# Patient Record
Sex: MALE | Race: BLACK OR AFRICAN AMERICAN
[De-identification: names, ages, dates, MRNs, and addresses within clinical notes are randomized per-mention and may not be internally consistent; named-entity substitution may affect disease eponyms.]

---

## 2018-06-10 ENCOUNTER — HOSPITAL ENCOUNTER (EMERGENCY)
Dept: HOSPITAL 88 - ER | Age: 68
Discharge: HOME | End: 2018-06-10
Payer: COMMERCIAL

## 2018-06-10 VITALS — HEIGHT: 72 IN | WEIGHT: 208 LBS | BODY MASS INDEX: 28.17 KG/M2

## 2018-06-10 VITALS — SYSTOLIC BLOOD PRESSURE: 110 MMHG | DIASTOLIC BLOOD PRESSURE: 85 MMHG

## 2018-06-10 DIAGNOSIS — I50.9: ICD-10-CM

## 2018-06-10 DIAGNOSIS — J18.0: ICD-10-CM

## 2018-06-10 DIAGNOSIS — R05: ICD-10-CM

## 2018-06-10 DIAGNOSIS — E11.9: ICD-10-CM

## 2018-06-10 DIAGNOSIS — J44.1: ICD-10-CM

## 2018-06-10 DIAGNOSIS — R06.00: Primary | ICD-10-CM

## 2018-06-10 DIAGNOSIS — I10: ICD-10-CM

## 2018-06-10 LAB
ALBUMIN SERPL-MCNC: 3.8 G/DL (ref 3.5–5)
ALBUMIN/GLOB SERPL: 1.1 {RATIO} (ref 0.8–2)
ALP SERPL-CCNC: 71 IU/L (ref 40–150)
ALT SERPL-CCNC: 28 IU/L (ref 0–55)
ANION GAP SERPL CALC-SCNC: 13 MMOL/L (ref 8–16)
BASOPHILS # BLD AUTO: 0 10*3/UL (ref 0–0.1)
BASOPHILS NFR BLD AUTO: 0.3 % (ref 0–1)
BUN SERPL-MCNC: 18 MG/DL (ref 7–26)
BUN/CREAT SERPL: 16 (ref 6–25)
CALCIUM SERPL-MCNC: 9.8 MG/DL (ref 8.4–10.2)
CHLORIDE SERPL-SCNC: 100 MMOL/L (ref 98–107)
CK MB SERPL-MCNC: 3.6 NG/ML (ref 0–5)
CK SERPL-CCNC: 331 IU/L (ref 30–200)
CO2 SERPL-SCNC: 34 MMOL/L (ref 22–29)
DEPRECATED APTT PLAS QN: 38.1 SECONDS (ref 23.8–35.5)
DEPRECATED INR PLAS: 1.2
DEPRECATED NEUTROPHILS # BLD AUTO: 4.9 10*3/UL (ref 2.1–6.9)
EGFRCR SERPLBLD CKD-EPI 2021: > 60 ML/MIN (ref 60–?)
EOSINOPHIL # BLD AUTO: 0.2 10*3/UL (ref 0–0.4)
EOSINOPHIL NFR BLD AUTO: 2.8 % (ref 0–6)
ERYTHROCYTE [DISTWIDTH] IN CORD BLOOD: 14.4 % (ref 11.7–14.4)
GLOBULIN PLAS-MCNC: 3.5 G/DL (ref 2.3–3.5)
GLUCOSE SERPLBLD-MCNC: 140 MG/DL (ref 74–118)
HCT VFR BLD AUTO: 39.3 % (ref 38.2–49.6)
HGB BLD-MCNC: 12.3 G/DL (ref 14–18)
LYMPHOCYTES # BLD: 1.3 10*3/UL (ref 1–3.2)
LYMPHOCYTES NFR BLD AUTO: 16.7 % (ref 18–39.1)
MCH RBC QN AUTO: 29.8 PG (ref 28–32)
MCHC RBC AUTO-ENTMCNC: 31.3 G/DL (ref 31–35)
MCV RBC AUTO: 95.2 FL (ref 81–99)
MONOCYTES # BLD AUTO: 1.1 10*3/UL (ref 0.2–0.8)
MONOCYTES NFR BLD AUTO: 14.4 % (ref 4.4–11.3)
NEUTS SEG NFR BLD AUTO: 65.7 % (ref 38.7–80)
PLATELET # BLD AUTO: 206 X10E3/UL (ref 140–360)
POTASSIUM SERPL-SCNC: 4 MMOL/L (ref 3.5–5.1)
PROTHROMBIN TIME: 14.3 SECONDS (ref 11.9–14.5)
RBC # BLD AUTO: 4.13 X10E6/UL (ref 4.3–5.7)
SODIUM SERPL-SCNC: 143 MMOL/L (ref 136–145)

## 2018-06-10 PROCEDURE — 82550 ASSAY OF CK (CPK): CPT

## 2018-06-10 PROCEDURE — 83880 ASSAY OF NATRIURETIC PEPTIDE: CPT

## 2018-06-10 PROCEDURE — 80053 COMPREHEN METABOLIC PANEL: CPT

## 2018-06-10 PROCEDURE — 82553 CREATINE MB FRACTION: CPT

## 2018-06-10 PROCEDURE — 93005 ELECTROCARDIOGRAM TRACING: CPT

## 2018-06-10 PROCEDURE — 71046 X-RAY EXAM CHEST 2 VIEWS: CPT

## 2018-06-10 PROCEDURE — 84484 ASSAY OF TROPONIN QUANT: CPT

## 2018-06-10 PROCEDURE — 85025 COMPLETE CBC W/AUTO DIFF WBC: CPT

## 2018-06-10 PROCEDURE — 36415 COLL VENOUS BLD VENIPUNCTURE: CPT

## 2018-06-10 PROCEDURE — 85610 PROTHROMBIN TIME: CPT

## 2018-06-10 PROCEDURE — 99284 EMERGENCY DEPT VISIT MOD MDM: CPT

## 2018-06-10 PROCEDURE — 85730 THROMBOPLASTIN TIME PARTIAL: CPT

## 2018-06-10 NOTE — XMS REPORT
Clinical Summary

 Created on: 06/10/2018



Tom Roacarolynn ANTON

External Reference #: [id number]

: 1950

Sex: Male



Demographics







 Address  [address] [place]  [address]

 

 Home Phone  [phone]

 

 Preferred Language  English

 

 Marital Status  

 

 Anabaptist Affiliation  Unknown

 

 Race  Black or 

 

 Ethnic Group  Non-





Author







 Author  [facility]

 

 Organization  [facility]

 

 Address  Unknown

 

 Phone  Unavailable







Support







 Name  Relationship  Address  Phone

 

 Ana Roa  [employer]  [address] [place]  [address]  [phone]







Care Team Providers







 Care Team Member Name  Role  Phone

 

 Asked, Pcp  PCP  Unavailable







Allergies

No Known Allergies



Current Medications







      



  Prescription   Sig.   Disp.   Refills   Start   End Date   Status



      Date  

 

      



  acetaminophen (TYLENOL)   Take 500 mg by mouth 2       Active



  500 MG tablet   (two) times a day. For     



   arthritis     

 

      



  albuterol (PROAIR   Inhale 2 puffs 2 (two)       Active



  HFA,PROVENTIL   times a day as needed for     



  HFA,VENTOLIN HFA) 90   wheezing.     



  mcg/actuation inhaler      

 

      



  aspirin (ECOTRIN) 81 MG   Take 81 mg by mouth       Active



  enteric coated tablet   daily.     

 

      



  atorvastatin (LIPITOR) 10   Take 10 mg by mouth       Active



  MG tablet   daily.     

 

      



  glipiZIDE (GLUCOTROL) 5   Take 5 mg by mouth daily.       Active



  MG 24 hr tablet      

 

      



  clotrimazole 1 % ointment   Apply 1 application       Active



   topically 2 (two) times a     



   week. On Sundays and     



    for rash on     



   arm     

 

      



  lisinopril   Take 20 mg by mouth       Active



  (PRINIVIL,ZESTRIL) 20 mg   nightly.     



  tablet      

 

      



  metFORMIN (GLUCOPHAGE)   Take 850 mg by mouth 2       Active



  850 mg tablet   (two) times a day with     



   meals.     

 

      



  sotalol (BETAPACE) 80 MG   Take 80 mg by mouth 2       Active



  tablet   (two) times a day.     

 

      



  ergocalciferol (VITAMIN   Take 50,000 Units by       Active



  D2) 50,000 unit capsule   mouth once a week. On     



        







Active Problems







 



  Problem   Noted Date

 

 



  COPD exacerbation   2017

 

 



  Acute CHF (congestive heart failure)   2017

 

 



  Essential hypertension   2017

 

 



  Type 2 diabetes mellitus   2017







Social History







    



  Tobacco Use   Types   Packs/Day   Years Used   Date

 

    



  Never Smoker    









   



  Alcohol Use   Drinks/Week   oz/Week   Comments

 

   



  No   









 



  Sex Assigned at Birth   Date Recorded

 

 



  Not on file 







Last Filed Vital Signs

Not on file



Plan of Treatment







   



  Health Maintenance   Due Date   Last Done   Comments

 

   



  DIABETIC FOOT EXAM   1958  

 

   



  DIABETIC RETINAL EYE EXAM   1958  

 

   



  URINE MICROALBUMIN   1958  

 

   



  COLON CANCER SCREENING   1998  

 

   



  SHINGRIX VACCINE (#1)   1998  

 

   



  ZOSTER VACCINE   2008  

 

   



  PNEUMOCOCCAL   2013  



  POLYSACCHARIDE VACCINE   



  AGE 65 AND OVER   

 

   



  PNEUMOCOCCAL-13   2013  

 

   



  INFLUENZA VACCINE   2018  







Results

Not on fileafter 2017



Insurance







     



  Payer   Benefit   Subscriber ID   Type   Phone   Address



   Plan /    



   Group    

 

     



  [employer]   [employer]   xxxxxxxxx   O  



   [facility]    









     



  Guarantor Name   Account   Relation to   Date of   Phone   Billing Address



   Type   Patient   Birth  

 

     



  AJITH ROA   Personal/F   Self   1950   Home:   [address]     [phone]   [place] [address]

## 2018-06-10 NOTE — DIAGNOSTIC IMAGING REPORT
EXAMINATION:  CHEST 2 VIEWS    



INDICATION:      

\S\SOB

\S\[id number]

\S\[id number]

\S.br\

COMPARISON:  3/6/2017

     

FINDINGS:  PA and lateral views



TUBES and LINES:  Stable single lead left lower lateral chest wall cardiac

device with tip overlying right atrium.



LUNGS and pleura:  Lungs are well inflated. Central peribronchovascular

thickening/cuffing. Left basilar subsegmental atelectasis and possible trace

left pleural effusion. No visible pneumothorax.



HEART AND MEDIASTINUM:  The cardiomediastinal silhouette is unremarkable.    



BONES AND SOFT TISSUES:  No acute osseous lesion.  Soft tissues are

unremarkable.



UPPER ABDOMEN: No free air under the diaphragm.    



IMPRESSION: 

Central peribronchovascular thickening/cuffing. 

Left basilar subsegmental atelectasis and possible trace left pleural effusion.

Underlying/developing infiltrate cannot be excluded.





Signed by: Dr. Kevin Ragland MD on 6/10/2018 9:12 AM

## 2018-06-10 NOTE — XMS REPORT
Ambulatory Summary

 Created on: 04/10/2018



Ajith Monet

External Reference #: [id number]

: 1950

Sex: Male



Demographics







 Address  [address] [place]  [address]

 

 Home Phone  [phone]

 

 Preferred Language  English

 

 Marital Status  

 

 Rastafarian Affiliation  Unknown

 

 Race  Black or 

 

 Ethnic Group  Unknown





Author







 Organization  Unknown

 

 Address  [address] [place] [address]  [address]



 

 Phone  [phone]







Care Team Providers







 Care Team Member Name  Role  Phone

 

 Pranav Payne Jr  Unavailable  Unavailable



                                                      



Allergies

          





 Code  Code System  Name  Reaction  Severity  Status  Onset









 NKDA        



                                                                              



Medications

          





 Name  Status  Start Date  Stop Date  









 acetaminophen 500 mg tablet

 Take 1 tablet every day by oral route in the evening.  Completed    2017

 

 albuterol sulfate 2.5 mg/3 mL (0.083 %) solution for nebulization  Active    
Not available

 

 amoxicillin 875 mg-potassium clavulanate 125 mg tablet  Completed    2018

 

 Anoro Ellipta 62.5 mcg-25 mcg/actuation powder for inhalation

 Inhale 1 puff every day by inhalation route for 30 days.  Active    Not 
available

 

 Aspir-81 81 mg tablet,delayed release

 Take 1 tablet every day by oral route.  Completed    2017

 

 atorvastatin 80 mg tablet  Active    Not available

 

 azithromycin 250 mg tablet  Completed    2018

 

 Blood Glucose Test strips

 TEST BLOOD SUGAR 1X/DAY  Active    Not available

 

 bumetanide 2 mg tablet

 Take 1 tablet twice a day by oral route.  Active    Not available

 

 fluconazole 100 mg tablet  Completed    2017

 

 furosemide 80 mg tablet  Completed    2017

 

 glimepiride 2 mg tablet  Completed    2017

 

 glimepiride 4 mg tablet  Completed    2017

 

 glipizide 5 mg tablet

 TAKE 1/2 TABLET BY MOUTH DAILY  Active    Not available

 

 ibuprofen 400 mg tablet  Completed    2017

 

 ipratropium-albuterol 0.5 mg-3 mg(2.5 mg base)/3 mL nebulization soln

 USE ONE VIAL VIA NEBULIZER 4 TIMES DAILY AS NEEDED  Active    Not available

 

 levofloxacin 250 mg tablet  Completed    2017

 

 levofloxacin 500 mg tablet  Completed    2018

 

 levofloxacin 750 mg tablet  Completed    2017

 

 lisinopril 20 mg tablet  Active    Not available

 

 lisinopril 20 mg-hydrochlorothiazide 12.5 mg tablet  Completed    2017

 

 Longs Adult Low Strength ASA 81 mg tablet,delayed release

 Take 1 tablet every day by oral route.  Active    Not available

 

 metformin 850 mg tablet

 Take 1 tablet 3 times a day by oral route for 90 days.  Active    Not 
available

 

 methylprednisolone 4 mg tablets in a dose pack  Completed    2018

 

 montelukast 10 mg tablet  Active    Not available

 

 oseltamivir 75 mg capsule  Completed    2017

 

 potassium chloride ER 10 mEq capsule,extended release  Completed    2017

 

 potassium chloride ER 10 mEq tablet,extended release

 Take 3 tablets every day by oral route for 30 days.  Active    Not available

 

 potassium chloride ER 20 mEq tablet,extended release(part/cryst)  Completed  
  2017

 

 prednisone 20 mg tablet  Completed    2017

 

 prednisone 5 mg tablet  Completed    2017

 

 Proventil HFA 90 mcg/actuation aerosol inhaler

 Inhale 2 puffs twice a day by inhalation route.  Active    Not available

 

 ReliaMed Lancet 30 gauge  Completed    2017

 

 sotalol 80 mg tablet

 1 TABLET TWICE DAILY  Active    Not available

 

 Symbicort 160 mcg-4.5 mcg/actuation HFA aerosol inhaler

 Inhale 2 puffs twice a day by inhalation route.  Active    Not available

 

 trazodone 50 mg tablet  Completed    2017

 

 True Metrix Level 1 solution  Completed    2017

 

 TRUEplus Lancets 28 gauge

 Take 1 each twice a day by miscell. route for 90 days.  Active    Not 
available

 

 Vitamin D2 50,000 unit capsule

 Take 1 capsule every week by oral route.  Active    Not available









 Notes: PT ALSO USES OXIGEN AT HOME



                                                                               
                                                                               
                                                                               
                                                                               
                                                                               
                                                               



Problems

                      





 Name                    Status                    Onset Date                   
 Source                                    









 Type 2 Diabetes Mellitus  Active  2017  

 

 Mixed Hyperlipidemia  Active  2017  

 

 Gout  Active  2017  

 

 Hypertensive Heart and Renal Disease with (Congestive) Heart Failure  Active  
2017  

 

 Coronary Arteriosclerosis  Active  2017  

 

 Chronic Systolic Heart Failure  Active  2017  

 

 Chronic Obstructive Lung Disease  Active  2017  

 

 Cardiac Defibrillator in Situ  Unknown  2017  

 

 History of Coronary Artery Bypass Grafting  Active  2017  

 

 Diabetic Complication  Active  2017  

 

 History of Placement of Stent for Coronary Artery Disease  Active  2017  


 

 Chronic Kidney Disease Stage 3  Active  2017  

 

 Raised Prostate Specific Antigen  Active  2017  

 

 Chronic Atrial Flutter  Active  2018  



                                                                               
                                                                               
                                                            



Procedures

          





 Date  Name  Performed by  









   Stomach Surgery Procedure

Notes: AGE 59  Information not available

 

   Appendectomy

Notes: AGE 33  Information not available

 

 2017  Electrocardiogram  [facility]

 [address]

[place] [address] [phone] (Work Place)

 

 2018  XR, Chest, 2 View  [facility]

 [address] [place] [address]

 [phone] ([facility] Place)









 Notes: FIBRILLATOR INSERTION: AGE 63 (14)



                                                                               
                             



Lab Results

                      





 Date                    Name                    Specimen                    
Result                    Interpretation                    Description        
            Value                    Range                    Status           
         Address                                    









 2017  HIV-1/2 Ag and Abs Screen, 4TH Gen. W/rflx (39[address])     Normal      
HIV Ag/Ab, 4TH Gen   non-reactive  non-reactive  Final  [facility] 
Laboratory: [address] [place] [address]

 

 2017  PSA, Serum or Plasma     High      PSA, Total   12.1 NG/mL  < or=
4.0 NG/mL  Final  [facility]: [address] [place] [address]

 

 2017  CMP, Serum or Plasma     High      Glucose   237 mg/dL  65-99 mg/
dL  Final  [facility]: [address]

 

        High      Urea Nitrogen (BUN)   32 mg/dL  7-25 mg/dL  Final  [facility]: [address]

 

        High      Creatinine   1.72 mg/dL  0.70-1.25 mg/dL  Final  [facility]: [address]

 

        Low      eGFR Non-afr. American   40 mL/min/1.73m2  > or=60 mL/min/
1.73m2  Final  [facility]: [address]

 

        Low      eGFR African American   46 mL/min/1.73m2  > or=60 mL/min/
1.73m2  Final  [facility]: [address]

 

        Normal      BUN/creatinine Ratio   19 (calc)  6-22 (calc)  Final  
[facility]: [address]

 

        Normal      Sodium   141 mmol/L  135-146 mmol/L  Final  [facility]: [address]

 

        Normal      Potassium   4.3 mmol/L  3.5-5.3 mmol/L  Final  [facility]: [address]

 

        Normal      Chloride   99 mmol/L   mmol/L  Final  [facility]: [address]

 

        Normal      Carbon Dioxide   26 mmol/L  20-31 mmol/L  Final  [facility]: [address]

 

        Normal      Calcium   9.4 mg/dL  8.6-10.3 mg/dL  Final  [facility]: [address]

 

        Normal      Protein, Total   7.2 g/dL  6.1-8.1 g/dL  Final  [facility]: [address]

 

        Normal      Albumin   3.9 g/dL  3.6-5.1 g/dL  Final  [facility]: [address]

 

        Normal      Globulin   3.3 g/dL (calc)  1.9-3.7 g/dL (calc)  Final  
[facility]: [address]

 

        Normal      Albumin/globulin Ratio   1.2 (calc)  1.0-2.5 (calc)  
Final  [facility]: [address]

 

        Normal      Bilirubin, Total   0.5 mg/dL  0.2-1.2 mg/dL  Final  
[facility]: [address]

 

        Normal      Alkaline Phosphatase   74 U/L   U/L  Final  [facility]: [address]

 

        High      Ast   39 U/L  10-35 U/L  Final  [facility] 
Laboratory: [address]

 

        Normal      Alt   35 U/L  9-46 U/L  Final  [facility] 
Laboratory: [address]

 

 2017  Hepatitis C Virus RNA, Quant, PCR, Serum or Plasma     Normal      
Hepatitis C Antibody   non-reactive  non-reactive  Final  [facility]: [address]

 

        Normal      Signal to Cut-off   0.05  <1.00  Final  [facility]: [address]

 

 2017  HbA1C (Hemoglobin a1C), Blood     High      Hemoglobin a1C   6.3 % 
of total HGB  <5.7 % of total HGB  Final  [facility]: 
[address]

 

              EAG (mg/dL)   134 (calc)    Final  [facility] 
Laboratory: [address]

 

              EAG (mmol/L)   7.4 (calc)    Final  [facility] 
Laboratory: [address]

 

 2017  CBC W/ Auto Diff     Normal      White Blood Cell Count   8.2 
thousand/uL  3.8-10.8 thousand/uL  Final  [facility]: 
[address]

 

        Normal      Red Blood Cell Count   4.53 million/uL  4.20-5.80 million/
uL  Final  [facility]: [address]

 

        Normal      Hemoglobin   13.2 g/dL  13.2-17.1 g/dL  Final  [facility]: [address]

 

        Normal      Hematocrit   40.9 %  38.5-50.0 %  Final  [facility]: [address]

 

        Normal      Mcv   90.3 fL  80.0-100.0 fL  Final  [facility]: [address] [place]

 

        Normal      Mch   29.1 pg  27.0-33.0 pg  Final  [facility]: [address] [place]

 

        Normal      Mchc   32.3 g/dL  32.0-36.0 g/dL  Final  [facility]: [address] [place]

 

        Normal      Rdw   12.7 %  11.0-15.0 %  Final  [facility] 
Laboratory: [address]

 

        Normal      Platelet Count   313 thousand/uL  140-400 thousand/uL  
Final  [facility]: [address] [place]

 

        Normal      Mpv   11.7 fL  7.5-12.5 fL  Final  [facility]: [address]

 

        Normal      Absolute Neutrophils   4608 cells/uL  3922-2803 cells/uL  
Final  [facility]: [address]

 

              Absolute Band Neutrophils       Preliminary  [facility]: [address]

 

              Absolute Metamyelocytes       Preliminary  [facility]: [address]

 

              Absolute Myelocytes       Preliminary  [facility]: [address] [place]

 

              Absolute Promyelocytes       Preliminary  [facility]: [address]

 

        Normal      Absolute Lymphocytes   2124 cells/uL  850-3900 cells/uL  
Final  [facility]: [address] [place]

 

        Normal      Absolute Monocytes   927 cells/uL  200-950 cells/uL  
Final  [facility]: [address] [place]

 

        Normal      Absolute Eosinophils   492 cells/uL   cells/uL  
Final  [facility]: [address] [place]

 

        Normal      Absolute Basophils   49 cells/uL  0-200 cells/uL  Final  
[facility]: [address], [place]

 

              Absolute Blasts       Preliminary  [facility] 
Laboratory: [address] [place]

 

              Absolute Nucleated RBC       Preliminary  [facility]: [address] [place]

 

        Normal      Neutrophils   56.2 %    Final  [facility] 
Laboratory: [address], [place]

 

              Band Neutrophils       Preliminary  [facility] 
Laboratory: [address] [place]

 

              Metamyelocytes       Preliminary  [facility] 
Laboratory: [address]

 

              Myelocytes       Preliminary  [facility] 
Laboratory: [address] [place]

 

              Promyelocytes       Preliminary  [facility] 
Laboratory: [address]

 

        Normal      Lymphocytes   25.9 %    Final  [facility] 
Laboratory: [address], [place]

 

              Reactive Lymphocytes       Preliminary  [facility]: [address] [place]

 

        Normal      Monocytes   11.3 %    Final  [facility] 
Laboratory: [address] [place]

 

        Normal      Eosinophils   6.0 %    Final  [facility] 
Laboratory: [address] [place]

 

        Normal      Basophils   0.6 %    Final  [facility] 
Laboratory: [address], [place]

 

              Blasts       Preliminary  [facility]
: [address], [place]

 

              Nucleated RBC       Preliminary  [facility] 
Laboratory: [address], [place]

 

              Comment(s)       Preliminary  [facility] 
Laboratory: [address], [place]

 

 2017  LDL, Serum           Ldl   66       

 

 2017  HbA1C (Hemoglobin a1C), Blood           A1C   7.5       

 

   Glucose, Fingerstick, Blood           Blood Glucose: mg/dl   101      [facility]: [address] [address], [place]

 

   Glucose, Fingerstick, Blood           Blood Glucose: mg/dl   270      [facility]: [address] [address], [place]

 

   Electrocardiogram           Rate & Rhythm   rrr      [facility]: [address] [address], [place]

 

              Qrs         [facility]: [address] [address]Pending sale to [facility]

 

              GA Interval         [facility]: [address] [address], [place]

 

              QRS Duration         [facility]: [address] [address], 
[place]

 

              QT Interval         [facility]: [address] [address], [place]



                                                                               
                                                                               
                              



Past Encounters

                      





 2018

Edema of Lower Extremity; Chronic Kidney Disease Stage 3

Zahira Marrufo MD: [address], [facility] [address] [address], 
Ph. [phone]

 

 2018

Community Acquired Pneumonia; Chronic Obstructive Lung Disease; Type 2 Diabetes 
Mellitus with Multiple Complications

Zahira Marrufo MD: [address], [facility] [address] [address], 
Ph. [phone]

 

 2018

Asthma; Chronic Obstructive Lung Disease; Chronic Atrial Flutter; Chronic 
Kidney Disease Due to Type 2 Diabetes Mellitus

Zahira Marrufo MD: [address], [address] [address], 
Ph. [phone]

 

 2018

Chronic Obstructive Lung Disease; Acute Exacerbation of Chronic Obstructive 
Airways Disease; Pneumococcal Vaccination; Hypertensive Heart and Renal Disease 
with (Congestive) Heart Failure; Peripheral Vascular Disease; Renal Disorder 
Due to Type 2 Diabetes Mellitus

Zahira Marrufo MD: [address], [facility] [address], [place] [address], 
Ph. [phone]

 

 2017

Raised Prostate Specific Antigen; Lower Urinary Tract Symptoms Due to Benign 
Prostatic Hypertrophy; Type 2 Diabetes Mellitus; Chronic Kidney Disease Stage 3

Pranav Payne Jr, MD: [address], [facility] [address], [place] [address], Ph. [phone]
[phone]

 

 2017

Hypertensive Heart and Renal Disease with (Congestive) Heart Failure; Automatic 
Implantable Cardiac Defibrillator in Situ; Coronary Arteriosclerosis; History 
of Placement of Stent for Coronary Artery Disease; Type 2 Diabetes Mellitus; 
Acute Nontraumatic Kidney Injury; Screening for Malignant Neoplasm of Colon; 
Screening for Disorder; Screening for Malignant Neoplasm of Prostate; 
Immunization Refused; HIV Screening

Pranav Payne Jr, MD: [address], [facility] [address], [place] [address], Ph. [phone]
[phone]

 

 2017

Chest Pain; Coronary Arteriosclerosis; History of Placement of Stent for 
Coronary Artery Disease; Chronic Systolic Heart Failure; Cardiac Defibrillator 
in Situ; Hypertensive Heart and Renal Disease with (Congestive) Heart Failure; 
Mixed Hyperlipidemia; Type 2 Diabetes Mellitus; Chronic Obstructive Lung Disease
; Pneumococcal Vaccination; Viral Immunization; Body Mass Index 25-29 - 
Overweight; Raised Prostate Specific Antigen

Pranav Payne Jr, MD: [address] [facility], [facility] [address], [place] [address], Ph. [phone]
[phone]



                                                                               
                                                                     



Social History

          





 Smoking Status  Never Smoker   



                                                                              



Vaccine List

          





 Vaccine Type

 

 influenza, unspecified formulation

 

 2016

 

 pneumococcal polysaccharide PPV23

 

 10/17/2014

 

 Tdap

 

 2014



                                                                               
                   



Plan of Care

                      





 Reminders                                                              
Provider                

 

 Appointments  None recorded.    

 

 Lab  None recorded.    

 

 Referral  None recorded.    

 

 Procedures  None recorded.    

 

 Surgeries  None recorded.    

 

 Imaging  None recorded.    



                                                                              



Vitals

          2018 03:45PM Est Patient







 Height    Weight    BMI    Blood Pressure 

 

  6 ft      209 lbs      28.3 kg/m2      120/70 mm[Hg]  



2018 03:00PM Work In Same Day







 Height    Weight    BMI    Blood Pressure 

 

  6 ft      207 lbs      28.1 kg/m2      120/82 mm[Hg]  



2018 10:00AM Est Patient







 Height    Weight    BMI    Blood Pressure 

 

  6 ft      207 lbs      28.1 kg/m2      118/70 mm[Hg]  



2018 02:45PM Work In Same Day







 Height    Weight    BMI    Blood Pressure 

 

  6 ft      207 lbs      28.1 kg/m2      110/70 mm[Hg]  



2017 10:00AM Work In Same Day







 Height    Weight    BMI    Blood Pressure 

 

  6 ft      208.2 lbs      28.2 kg/m2      118/72 mm[Hg]  



2017 04:00PM Est Patient







 Height    Weight    BMI    Blood Pressure 

 

  6 ft      205.2 lbs      27.8 kg/m2      116/68 mm[Hg]  



2017 10:30AM NP/EST CPX







 Height    Weight    BMI    Blood Pressure 

 

  6 ft      217 lbs      29.4 kg/m2      136/84 mm[Hg]

## 2018-06-10 NOTE — XMS REPORT
Patient Summary Document

 Created on: 06/10/2018



PAUL ROA

External Reference #: [id number]

: 1950

Sex: Male



Demographics







 Address  [address] [place] [address]  [address]

 

 Home Phone  [phone]

 

 Preferred Language  Unknown

 

 Marital Status  Unknown

 

 Scientologist Affiliation  Unknown

 

 Race  Unknown

 

 Ethnic Group  Unknown





Author







 Author  [facility]

 

 Address  Unknown

 

 Phone  Unavailable







Care Team Providers







 Care Team Member Name  Role  Phone

 

 ER, PHYSICIAN  PP  Unavailable







Problems

This patient has no known problems.



Allergies, Adverse Reactions, Alerts

This patient has no known allergies or adverse reactions.



Medications

This patient has no known medications.



Encounters







 Start Date/Time  End Date/Time  Encounter Type  Admission Type  Attending 
Clinicians  Care Facility  Care Department  Encounter ID

 

 2017 17:44:00     Inpatient        [facility]  MED  [id number]

## 2020-09-18 ENCOUNTER — HOSPITAL ENCOUNTER (EMERGENCY)
Dept: HOSPITAL 88 - ER | Age: 70
Discharge: HOME | End: 2020-09-18
Payer: MEDICARE

## 2020-09-18 VITALS — HEIGHT: 72 IN | BODY MASS INDEX: 28.17 KG/M2 | WEIGHT: 208 LBS

## 2020-09-18 DIAGNOSIS — E11.9: ICD-10-CM

## 2020-09-18 DIAGNOSIS — I25.10: ICD-10-CM

## 2020-09-18 DIAGNOSIS — Z99.81: ICD-10-CM

## 2020-09-18 DIAGNOSIS — I10: ICD-10-CM

## 2020-09-18 DIAGNOSIS — I50.9: ICD-10-CM

## 2020-09-18 DIAGNOSIS — Z48.00: Primary | ICD-10-CM

## 2020-09-18 DIAGNOSIS — Z95.810: ICD-10-CM

## 2020-09-18 PROCEDURE — 99282 EMERGENCY DEPT VISIT SF MDM: CPT

## 2020-09-18 NOTE — EMERGENCY DEPARTMENT NOTE
History of Present Illnes


History of Present Illness


Chief Complaint:  Skin Rash or Abscess


History of Present Illness


This is a 69 year old  male  .








Chief Complaint Comment Patient in from home requesting a wound to his right 

lower extremity be "wrapped". Patient states that he attempted to do the wound 

care himself but decided it would be better if it was done by a "professional". 

Patient reports the wound has been there for about 3 days and started as a 

blister that popped. Patient has two areas that are very superficial and do have

the appearance of popped blisters.


Historian:  Patient


Arrival Mode:  Car





Past Medical/Family History


Physician Review


I have reviewed the patient's past medical and family history.  Any updates have

been documented here.





Past Medical History


Recent Fever:  No


Clinical Suspicion of Infectio:  No


New/Unexplained Change in Ment:  No


Past Medical History:  Hypertension, Diabetes, COPD, CHF, CAD


Other Medical History:  


Home O2 prn


Past Surgical History:  Appendectomy, PCI, Pacer/AICD


Other Surgery:  


DESTINY





PCIx2





Other


Last Tetanus:  UTD





Physical Exam


Related Data


Allergies:  


Coded Allergies:  


     No Known Allergies (Unverified , 6/10/18)


Triage Vital Signs





Vital Signs








  Date Time  Temp Pulse Resp B/P (MAP) Pulse Ox O2 Delivery O2 Flow Rate FiO2


 


9/18/20 09:39 98.4 69 14 95/75 100 Room Air  











Physical Exam


CONSTITUTIONAL





HENT


EYES





NECK


PULMONARY


CARDIOVASCULAR





GASTROINTESTINAL





GENITOURINARY





SKIN


MUSCULOSKELETAL





NEUROLOGICAL





PSYCHOLOGICAL





Assessment & Plan


Medical Decision Making


MDM


Patient eloped from the emergency department prior to my complete evaluation, 

states he no longer wishes to be seen in the ED.





Assessment & Plan


Final Impression:  


(1) Encounter for wound care


Last Vital Signs











  Date Time  Temp Pulse Resp B/P (MAP) Pulse Ox O2 Delivery O2 Flow Rate FiO2


 


9/18/20 09:39 98.4 69 14 95/75 100 Room Air  

















CHRISTOPHER YODER DO            Sep 18, 2020 09:52

## 2020-09-18 NOTE — XMS REPORT
Continuity of Care Document

                             Created on: 2020



PAUL ROA DESEAN

External Reference #: [id number]

: 1950

Sex: Male



Demographics





                          Address                   [address] [place] [address]

 

                          Home Phone                [phone]

 

                          Preferred Language        Unknown

 

                          Marital Status            Unknown

 

                          Scientologist Affiliation     Unknown

 

                          Race                      Unknown

 

                                        Additional Race(s) 

Afro-American

Black or 



 

                          Ethnic Group              Unknown





Author





                          Author                    [facility]

t

 

                          Organization              [facility]

 

                          Address                   [address] [place] [address] [address]

[place]  [address]



 

                          Phone                     Unavailable







Support





                Name            Relationship    Address         Phone

 

                Ana Roa  ECON            Unknown         [phone]







Care Team Providers





                    Care Team Member Name Role                Phone

 

                    MIROSLAVA VILLANUEVA PCP                 [phone]

 

                    GAMA,  TRAM         Attphys             Unavailable

 

                    Benrey,  Joey      Attphys             Unavailable

 

                    Benrey,  Joey      Attphys             Unavailable

 

                    ROBLES,  LEMON    Attphys             Unavailable

 

                    ARGELIA,  RAMU        Attphys             Unavailable

 

                    SHERICE CHAUDHRY    Attphys             Unavailable

 

                    Benrey,  Joey      Admphys             Unavailable







Payers





           Payer Name Policy Type Policy Number Effective Date Expiration Date S

ource

 

           [employer] Medicare Advantage            [id number]                      

 [facility]

 

           Miscellaneous Hmo            [id number]                       [facility]

 

           [employer]            [id number]  2018-06-10 00:00:00            [facility]







Problems





           Condition Name Condition Details Condition Category Status     Onset 

Date Resolution

Date            Last Treatment Date Treating Clinician Comments        Source

 

             Chronic kidney disease stage 3 Chronic Kidney Disease Stage 3 Probl

em      Active       

2019-10-09 00:00:00                                                     [facility]

 

                          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

ype 2 Diabetes Mellitus 

with Multiple Complications Problem Active  2019-10-09 00:00:00                 

                [facility]

 

                          Mild protein-calorie malnutrition (weight for age 75-8

9% of standard) Mild 

Protein-calorie Malnutrition (Weight for Age 75-89% of Standard) Problem        

     Active              

2019 00:00:00                                                     [facility]

 

             Chronic atrial flutter Chronic Atrial Flutter Problem      Active  

     2018 00:00:00

                                                                 [facility]

 

                Raised prostate specific antigen Raised Prostate Specific Antige

n Problem         Active

           2017 00:00:00                                             Jared

[facility]

 

                          History of placement of stent for coronary artery dise

ase History of Placement 

of Stent for Coronary Artery Disease Problem Active  2017 00:00:00        

                         

[facility]

 

             Type 2 diabetes mellitus Type 2 Diabetes Mellitus Problem      Acti

ve       2017 

00:00:00                                                         [facility]

 

          Mixed hyperlipidemia Mixed Hyperlipidemia Problem   Active     00:00:00            

                                                            [facility]

ice

 

       Gout   Gout   Problem Active 2017 00:00:00                         

    [facility]

 

                          Hypertensive heart and renal disease with (congestive)

 heart failure 

Hypertensive Heart and Renal Disease with (Congestive) Heart Failure Problem    

               

Active     2017 00:00:00                                             [facility]

 

             Coronary arteriosclerosis Coronary Arteriosclerosis Problem      Ac

tive       2017 

00:00:00                                                         [facility]

 

             Chronic systolic heart failure Chronic Systolic Heart Failure Probl

em      Active       

2017 00:00:00                                                     [facility]

 

                Chronic obstructive lung disease Chronic Obstructive Lung Diseas

e Problem         Active

           2017 00:00:00                                             [facility]

 

                          History of coronary artery bypass grafting History of 

Coronary Artery Bypass 

Grafting Problem Active  2017 00:00:00                                 [facility]

 

       Chest pain Chest pain Disease Active 2015 00:00:00                 

            [facility]

 

       Problem        Condition Active                                    [facility]







Allergies, Adverse Reactions, Alerts





        Allergy Name Allergy Type Status  Severity Reaction(s) Onset Date Inacti

ve Date 

Treating Clinician        Comments                  Source

 

        Penicillins Propensity to adverse reactions Active          Rash    2015 00:00:00         

                                                    [facility]

r







Social History





           Social Habit Start Date Stop Date  Quantity   Comments   Source

 

           Sex Assigned At Birth                                             [facility]







                Smoking Status  Start Date      Stop Date       Source

 

                Former smoker   2015 00:00:00 2015 00:00:00 [facility]







Medications





             Ordered Medication Name Filled Medication Name Start Date   Stop Da

te    Current 

Medication? Ordering Clinician Indication Dosage     Frequency  Signature (SIG) 

Comments                  Components                Source

 

       sotalol AF (BETAPACE AF) 80 MG tablet        2015 10:16:25        Y

es                  80mg   Q.5D   

Take 80 mg by mouth 2 (two) times daily.                                        

 [facility]

 

        hydrochlorothiazide (MICROZIDE) 12.5 mg capsule         2015 23:25

:33         Yes                     

12.5mg       Q.5D         Take 12.5 mg by mouth 2 (two) times daily .           

                [facility]

 

       levofloxacin (LEVAQUIN) 500 MG tablet        2015 21:11:32        Y

es                  500mg  QD     

Take 500 mg by mouth daily.                                         [facility]

 

             acetaminophen-codeine (TYLENOL #4) 300-60 mg per tablet            

  2015 21:11:32              

Yes                                    1{tbl}                    Take 1 tablet b

y mouth every 4 (four) hours as needed for Pain.

                                                            [facility]

 

        potassium chloride (KLOR-CON) 8 MEQ CR tablet         2015 21:11:3

2         Yes                     

8meq         QD           Take 8 mEq by mouth daily.                           C

[facility]

 

        lisinopril (PRINIVIL,ZESTRIL) 20 MG tablet         2015 21:11:32  

       Yes                     20mg    

Q.5D            Take 20 mg by mouth 2 (two) times daily.                        

         [facility]

 

       furosemide (LASIX) 40 MG tablet        2015 21:11:32        Yes    

              80mg   QD     Take 80 

mg by mouth daily.                                          [facility]

 

                    albuterol (PROVENTIL) 2.5 mg/0.5 mL Nebu nebulizer solution 

                    2015 

21:11:32         Yes                     2.5mg   Q.5D    Take 2.5 mg by nebuliza

tion 2 (two) times daily.  

                                                    [facility]

r

 

       aspirin 81 MG chewable tablet        2015 21:11:32        Yes      

            81mg   QD     Take 81 mg

by mouth daily.                                             [facility]

 

        metFORMIN (GLUCOPHAGE) 850 MG tablet         2015 21:11:31        

 Yes                     850mg   

[id number] Take 850 mg by mouth 3 (three) times daily.              

                   [facility]

 

       simvastatin (ZOCOR) 40 MG tablet        2015 21:11:31        Yes   

               40mg   QD     Take 40

mg by mouth nightly.                                         [facility]

 

       glimepiride (AMARYL) 2 MG tablet        2015 21:11:31        Yes   

               2mg    QD     Take 2 

mg by mouth nightly.                                         [facility]

 

                                        acetaminophen 500 mg tablet Take 1 table

t twice a day by oral route as needed. 

TAKE WITH FOOD                          acetaminophen 500 mg tablet Take 1 table

t twice a day by oral 

route as needed. TAKE WITH FOOD                 No                      1       

BID     acetaminophen 500 mg tablet 

Take 1 tablet twice a day by oral route as needed. TAKE WITH FOOD               

                          [facility]

 

                          albuterol sulfate 2.5 mg/3 mL (0.083 %) solution for n

ebulization albuterol 

sulfate 2.5 mg/3 mL (0.083 %) solution for nebulization                 No      

                                albuterol

sulfate 2.5 mg/3 mL (0.083 %) solution for nebulization                         

                [facility]

 

                          amiodarone 200 mg tablet Take 1 tablet twice a day by 

oral route for 30 days. 

amiodarone 200 mg tablet Take 1 tablet twice a day by oral route for 30 days.   

                        

           No                                                     amiodarone 200

 mg tablet Take 1 tablet twice a day by oral route for

30 days.                                                    [facility]

 

                                        aspirin 81 mg tablet,delayed release Sachin

e 1 tablet 3 times a week by oral route.

                                        aspirin 81 mg tablet,delayed release Sachin

e 1 tablet 3 times a week by oral route.

                        No                      1       Q56H    aspirin 81 mg ta

blet,delayed release Take 1 tablet 3 times a 

week by oral route.                                         [facility]

ice

 

                          atorvastatin 80 mg tablet Take 1 tablet every day by o

ral route. atorvastatin 80

mg tablet Take 1 tablet every day by oral route.                 No             

         1       Q1D     atorvastatin 

80 mg tablet Take 1 tablet every day by oral route.                             

            [facility]

 

                          bumetanide 2 mg tablet Take 1 tablet twice a day by or

al route for 90 days. 

bumetanide 2 mg tablet Take 1 tablet twice a day by oral route for 90 days.     

                                    

No                                     1            BID          bumetanide 2 mg

 tablet Take 1 tablet twice a day by oral route for 

90 days.                                                    [facility]

ice

 

                          carvedilol 6.25 mg tablet Take 0.5 tablets twice a day

 by oral route. carvedilol

6.25 mg tablet Take 0.5 tablets twice a day by oral route.                 No   

                   .5      BID     

carvedilol 6.25 mg tablet Take 0.5 tablets twice a day by oral route.           

                              

[facility]

 

                                        clotrimazole 1 % topical cream APPLY TO 

THE AFFECTED AND SURROUNDING AREAS OF 

SKIN BY TOPICAL ROUTE 2 TIMES PER DAY IN THE MORNING AND EVENING clotrimazole 1 

% topical cream APPLY TO THE AFFECTED AND SURROUNDING AREAS OF SKIN BY TOPICAL 
ROUTE 2 TIMES PER DAY IN THE MORNING AND EVENING                 No             

                         clotrimazole 1 %

topical cream APPLY TO THE AFFECTED AND SURROUNDING AREAS OF SKIN BY TOPICAL 
ROUTE 2 TIMES PER DAY IN THE MORNING AND EVENING                                

         [facility]

 

                                        doxycycline hyclate 100 mg tablet Take 1

 tablet twice a day by oral route for 10

days.                                   doxycycline hyclate 100 mg tablet Take 1

 tablet twice a day by oral route 

for 10 days.                 No                                      doxycycline

 hyclate 100 mg tablet Take 1 tablet 

twice a day by oral route for 10 days.                                         V

illage [facility]

 

                                        ferrous sulfate 325 mg (65 mg iron) tabl

et,delayed release Take 1 tablet twice a

day by oral route.                      ferrous sulfate 325 mg (65 mg iron) tabl

et,delayed release 

Take 1 tablet twice a day by oral route.                 No                     

 1       BID     ferrous sulfate 325 mg

(65 mg iron) tablet,delayed release Take 1 tablet twice a day by oral route.    

                        

                                        [facility]

 

                                        fluticasone propionate 50 mcg/actuation 

nasal spray,suspension Spray 1 spray 

twice a day by intranasal route for 90 days. fluticasone propionate 50 

mcg/actuation nasal spray,suspension Spray 1 spray twice a day by intranasal 
route for 90 days.                 No                                      fluti

casone propionate 50 mcg/actuation nasal 

spray,suspension Spray 1 spray twice a day by intranasal route for 90 days.     

                                    

[facility]

 

                          glipizide 5 mg tablet TAKE 1/2 TABLET BY MOUTH EVERY D

AY glipizide 5 mg tablet 

TAKE 1/2 TABLET BY MOUTH EVERY DAY                 No                           

           glipizide 5 mg tablet TAKE 1/2

TABLET BY MOUTH EVERY DAY                                         [facility]

 

                                        ipratropium 0.5 mg-albuterol 3 mg (2.5 m

g base)/3 mL nebulization soln USE ONE 

VIAL VIA NEBULIZER 4 TIMES DAILY AS NEEDED ipratropium 0.5 mg-albuterol 3 mg 

(2.5 mg base)/3 mL nebulization soln USE ONE VIAL VIA NEBULIZER 4 TIMES DAILY AS
NEEDED                  No                                      ipratropium 0.5 

mg-albuterol 3 mg (2.5 mg base)/3 mL 

nebulization soln USE ONE VIAL VIA NEBULIZER 4 TIMES DAILY AS NEEDED            

                             [facility]

 

             lactulose 10 gram/15 mL oral solution lactulose 10 gram/15 mL oral 

solution                           

No                                      lactulose 10 gram/15 mL oral solution   

              [facility]

 

                          lisinopril 10 mg tablet Take 0.5 tablets every day by 

oral route. lisinopril 10 

mg tablet Take 0.5 tablets every day by oral route.                 No          

            .5      Q1D     lisinopril

10 mg tablet Take 0.5 tablets every day by oral route.                          

               [facility]

 

                          loratadine 10 mg tablet Take 1 tablet every day by ora

l route for 90 days. 

loratadine 10 mg tablet Take 1 tablet every day by oral route for 90 days.      

                                   

No                                     1            Q1D          loratadine 10 m

g tablet Take 1 tablet every day by oral route for 

90 days.                                                    [facility]

ice

 

                          metolazone 5 mg tablet TAKE 1 TABLET BY MOUTH EVERY DA

Y IN THE MORNING 

metolazone 5 mg tablet TAKE 1 TABLET BY MOUTH EVERY DAY IN THE MORNING          

                 No                        

                                 metolazone 5 mg tablet TAKE 1 TABLET BY MOUTH E

VERY DAY IN THE MORNING                       

[facility]

 

                          OneTouch Verio strips Take 1 strip every day by miscel

l. route. OneTouch Verio 

strips Take 1 strip every day by miscell. route.                 No             

                         OneTouch Verio 

strips Take 1 strip every day by miscell. route.                                

         [facility]

 

                                        potassium chloride ER 10 mEq tablet,exte

nded release TAKE 3 TABLETS BY MOUTH 

EVERY DAY                               potassium chloride ER 10 mEq tablet,exte

nded release TAKE 3 TABLETS BY

MOUTH EVERY DAY                 No                                      potassiu

m chloride ER 10 mEq tablet,extended 

release TAKE 3 TABLETS BY MOUTH EVERY DAY                                       

  [facility]

 

                          prednisone 20 mg tablet Take 1 tablet every day by ora

l route for 7 days. 

prednisone 20 mg tablet Take 1 tablet every day by oral route for 7 days.       

                                  No

                                                                 prednisone 20 m

g tablet Take 1 tablet every day by oral route for 7 

days.                                                       [facility]

ice

 

                                        Symbicort 160 mcg-4.5 mcg/actuation HFA 

aerosol inhaler Inhale 2 puffs twice a 

day by inhalation route.                Symbicort 160 mcg-4.5 mcg/actuation HFA 

aerosol inhaler

Inhale 2 puffs twice a day by inhalation route.                 No              

        2puff(s) BID     

Symbicort 160 mcg-4.5 mcg/actuation HFA aerosol inhaler Inhale 2 puffs twice a 
day by inhalation route.                                         [facility]

 

                          trazodone 50 mg tablet Take 1 tablet every day by oral

 route for 90 days. 

trazodone 50 mg tablet Take 1 tablet every day by oral route for 90 days.       

                                  No

                                                                 trazodone 50 mg

 tablet Take 1 tablet every day by oral route for 90 

days.                                                       [facility]

ice

 

                                        Ventolin HFA 90 mcg/actuation aerosol in

haler Inhale 2 puffs twice a day by 

inhalation route.                       Ventolin HFA 90 mcg/actuation aerosol in

haler Inhale 2 puffs 

twice a day by inhalation route.                 No                             

         Ventolin HFA 90 mcg/actuation 

aerosol inhaler Inhale 2 puffs twice a day by inhalation route.                 

                        [facility]







Immunizations





           Ordered Immunization Name Filled Immunization Name Date       Status 

    Comments   Source

 

                    influenza, injectable, quadrivalent influenza, injectable, q

uadrivalent 

2018 00:00:00 Completed                               [facility]

ice

 

           DTaP-IPV   DTaP-IPV   2017 00:00:00 Completed             [facility]

 

                    influenza, unspecified formulation influenza, unspecified fo

rmulation 2016

00:00:00            Completed                               [facility]

ice

 

                    pneumococcal polysaccharide PPV23 pneumococcal polysaccharid

e PPV23 2014-10-17 

00:00:00            Completed                               [facility]

ice

 

           Tdap       Tdap       2014 00:00:00 Completed             [facility]







Vital Signs





             Vital Name   Observation Time Observation Value Comments     Source

 

             BP Diastolic 2019 00:00:00 64 mm[Hg]                 [facility]

 

             Height       2019 00:00:00 72 [in_i]                 [facility]

 

             BMI (Body Mass Index) 2019 00:00:00 26 kg/m2                 

 [facility]

 

             BP Systolic  2019 00:00:00 118 mm[Hg]                [facility]

 

             Body Weight  2019 00:00:00 192 [lb_av]               [facility]

 

             BP Diastolic 2019-10-09 00:00:00 76 mm[Hg]                 [facility]

 

             Height       2019-10-09 00:00:00 72 [in_i]                 [facility]

 

             BMI (Body Mass Index) 2019-10-09 00:00:00 26.7 kg/m2               

 [facility]

 

             BP Systolic  2019-10-09 00:00:00 120 mm[Hg]                [facility]

 

             Body Weight  2019-10-09 00:00:00 197.2 [lb_av]              [facility]

 

             BP Diastolic 2019 00:00:00 66 mm[Hg]                 [facility]

 

             Height       2019 00:00:00 72 [in_i]                 [facility]

 

             BMI (Body Mass Index) 2019 00:00:00 24.5 kg/m2               

 [facility]

 

             BP Systolic  2019 00:00:00 100 mm[Hg]                [facility]

 

             Body Weight  2019 00:00:00 181 [lb_av]               [facility]

 

             BP Diastolic 2019 00:00:00 66 mm[Hg]                 [facility]

 

             Height       2019 00:00:00 72 [in_i]                 [facility]

 

             BMI (Body Mass Index) 2019 00:00:00 28.2 kg/m2               

 [facility]

 

             BP Systolic  2019 00:00:00 108 mm[Hg]                [facility]

 

             Body Weight  2019 00:00:00 208 [lb_av]               [facility]

 

             BP Diastolic 2019 00:00:00 60 mm[Hg]                 [facility]

 

             Height       2019 00:00:00 72 [in_i]                 [facility]

 

             BMI (Body Mass Index) 2019 00:00:00 27.5 kg/m2               

 [facility]

 

             BP Systolic  2019 00:00:00 110 mm[Hg]                [facility]

 

             Body Weight  2019 00:00:00 203 [lb_av]               [facility]

 

             BP Diastolic 2019 00:00:00 84 mm[Hg]                 [facility]

 

             Height       2019 00:00:00 72 [in_i]                 [facility]

 

             BMI (Body Mass Index) 2019 00:00:00 28.8 kg/m2               

 [facility]

 

             BP Systolic  2019 00:00:00 128 mm[Hg]                [facility]

 

             Body Weight  2019 00:00:00 212 [lb_av]               [facility]

 

             BP Diastolic 2019 00:00:00 70 mm[Hg]                 [facility]

 

             Height       2019 00:00:00 72 [in_i]                 [facility]

 

             BMI (Body Mass Index) 2019 00:00:00 28.6 kg/m2               

 [facility]

 

             BP Systolic  2019 00:00:00 110 mm[Hg]                [facility]

 

             Body Weight  2019 00:00:00 211 [lb_av]               [facility]

 

             BP Diastolic 2019 00:00:00 72 mm[Hg]                 [facility]

 

             Height       2019 00:00:00 72 [in_i]                 [facility]

 

             BMI (Body Mass Index) 2019 00:00:00 27.4 kg/m2               

 [facility]

 

             BP Systolic  2019 00:00:00 126 mm[Hg]                [facility]

 

             Body Weight  2019 00:00:00 202 [lb_av]               [facility]

 

             BP Diastolic 2019 00:00:00 70 mm[Hg]                 [facility]

 

             Height       2019 00:00:00 72 [in_i]                 [facility]

 

             BMI (Body Mass Index) 2019 00:00:00 26.9 kg/m2               

 [facility]

 

             BP Systolic  2019 00:00:00 104 mm[Hg]                [facility]

 

             Body Weight  2019 00:00:00 198 [lb_av]               [facility]

 

             BP Diastolic 2019 00:00:00 58 mm[Hg]                 [facility]

 

             Height       2019 00:00:00 72 [in_i]                 [facility]

 

             BMI (Body Mass Index) 2019 00:00:00 27.8 kg/m2               

 [facility]

 

             BP Systolic  2019 00:00:00 110 mm[Hg]                [facility]

 

             Body Weight  2019 00:00:00 205 [lb_av]               [facility]

 

             BP Diastolic 2019 00:00:00 76 mm[Hg]                 [facility]

 

             Height       2019 00:00:00 72 [in_i]                 [facility]

 

             BMI (Body Mass Index) 2019 00:00:00 27.8 kg/m2               

 [facility]

 

             BP Systolic  2019 00:00:00 126 mm[Hg]                [facility]

 

             Body Weight  2019 00:00:00 205 [lb_av]               [facility]

 

             Weight       2020 09:39:00 208 [lb_av]               [facility]

 

             BMI (Body Mass Index) 2020 09:39:00 28.2 kg/m2               

 [facility]

 

             BP Diastolic 2018 00:00:00 70 mm[Hg]                 [facility]

 

             Height       2018 00:00:00 72 [in_i]                 [facility]

 

             BMI (Body Mass Index) 2018 00:00:00 28.3 kg/m2               

 [facility]

 

             BP Systolic  2018 00:00:00 120 mm[Hg]                [facility]

 

             Body Weight  2018 00:00:00 209 [lb_av]               [facility]

 

             BP Diastolic 2018 00:00:00 82 mm[Hg]                 [facility]

 

             Height       2018 00:00:00 72 [in_i]                 [facility]

 

             BMI (Body Mass Index) 2018 00:00:00 28.1 kg/m2               

 [facility]

 

             BP Systolic  2018 00:00:00 120 mm[Hg]                [facility]

 

             Body Weight  2018 00:00:00 207 [lb_av]               [facility]

 

             BP Diastolic 2018 00:00:00 70 mm[Hg]                 [facility]

 

             Height       2018 00:00:00 72 [in_i]                 [facility]

 

             BMI (Body Mass Index) 2018 00:00:00 28.1 kg/m2               

 [facility]

 

             BP Systolic  2018 00:00:00 118 mm[Hg]                [facility]

 

             Body Weight  2018 00:00:00 207 [lb_av]               [facility]

 

             BP Diastolic 2018 00:00:00 70 mm[Hg]                 [facility]

 

             Height       2018 00:00:00 72 [in_i]                 [facility]

 

             BMI (Body Mass Index) 2018 00:00:00 28.1 kg/m2               

 [facility]

 

             BP Systolic  2018 00:00:00 110 mm[Hg]                [facility]

 

             Body Weight  2018 00:00:00 207 [lb_av]               [facility]

 

             BP Diastolic 2017 00:00:00 72 mm[Hg]                 [facility]

 

             Height       2017 00:00:00 72 [in_i]                 [facility]

 

             BMI (Body Mass Index) 2017 00:00:00 28.2 kg/m2               

 [facility]

 

             BP Systolic  2017 00:00:00 118 mm[Hg]                [facility]

 

             Body Weight  2017 00:00:00 208.2 [lb_av]              [facility]

 

             BP Diastolic 2017 00:00:00 68 mm[Hg]                 [facility]

 

             Height       2017 00:00:00 72 [in_i]                 [facility]

 

             BMI (Body Mass Index) 2017 00:00:00 27.8 kg/m2               

 [facility]

 

             BP Systolic  2017 00:00:00 116 mm[Hg]                [facility]

 

             Body Weight  2017 00:00:00 205.2 [lb_av]              [facility]

 

             BP Diastolic 2017 00:00:00 84 mm[Hg]                 [facility]

 

             Height       2017 00:00:00 72 [in_i]                 [facility]

 

             BMI (Body Mass Index) 2017 00:00:00 29.4 kg/m2               

 [facility]

 

             BP Systolic  2017 00:00:00 136 mm[Hg]                [facility]

 

             Body Weight  2017 00:00:00 217 [lb_av]               [facility]







Procedures





                Procedure       Date / Time Performed Performing Clinician Sour

e

 

                CT, abdomen + pelvis, w/ contrast 2019 00:00:00           

      [facility]

 

                CT, pelvis, w/o contrast 2019 00:00:00                 Juan

katarina [facility]

 

                CT, chest, w/o contrast 2019 00:00:00                 [facility]

 

                CT, abdomen, w/wo contrast 2019 00:00:00                 [facility]

[facility]

 

                ankle brachial index 2019 00:00:00                 [facility]

 

                2VIEWS RADIOLOGIC EXAMINATION, CHEST 2018 00:00:00        

         [facility]

 

                electrocardiogram 2017 00:00:00                 [facility]

 

                Stomach Surgery Procedure                                 [facility]

[facility]

 

                Appendectomy                                    [facility]

ractice

 

                Appendectomy                                    [facility]

ractice

 

                Appendectomy                                    [facility]

ractice







Plan of Care





             Planned Activity Planned Date Details      Comments     Source

 

                    Diagnostic Test Pending 2019 00:00:00 glucose, fingers

tick, blood [code = 

glucose, fingerstick, blood]                           [facility]

 

             Instructions              Rash - Nonspecific              [facility]







Encounters





             Start Date/Time End Date/Time Encounter Type Admission Type Attendi

[facility] Facility   Care Department Encounter ID    Source

 

        2017 17:44:00         Inpatient                 [facility]    MED     17

[id number] [facility]

 

          2020 10:00:00 2020 10:50:00 Departed Emergency Room       

              [facility] [id number]              [facility]

 

        2020 00:00:00 2020 00:00:00 Outpatient         GAMA, TRAM HM

H     [facility]     

[id number]                           [facility]

 

        2020 00:00:00 2020 00:00:00 Outpatient         GAMA, TRAM HM

H     [facility]     

[id number]                           [facility]

 

             2020 07:36:00 2020 23:59:00 Outpatient   3            B

Joey lynch, [facility]            SOWMYA             [id number]       [facility]

 

             2020 07:36:00 2020 07:36:00 Outpatient   3            B

Joey lynch [facility]             [id number] [facility]

 

        2020 00:00:00 2020 00:00:00 Outpatient         GAMA, TRAM HM

H     [facility]     

[id number]                           [facility]

 

        2020 00:00:00 2020 00:00:00 Outpatient         ELIZA ROBLES

AN [facility]     

[id number]                           [facility]

 

        2020 00:00:00 2020 00:00:00 Outpatient         ELIZA ROBLES

AN [facility]     

[id number]                           [facility]

 

             2020 08:35:00 2020 23:59:00 Outpatient   3            B

Joey lynch [facility]             [id number]       [facility]

 

        2020 00:00:00 2020 00:00:00 Outpatient         GAMA, TRAM HM

H     [facility]     

[id number]                           [facility]

 

        2020 00:00:00 2020 00:00:00 Outpatient         GAMA, TRAM HM

H     [facility]     

[id number]                           [facility]

 

        2020 00:00:00 2020 00:00:00 Outpatient         GAMA, TRAM HM

H     [facility]     

[id number]                           [facility]

 

        2020-06-15 00:00:00 2020-06-15 00:00:00 Outpatient         RAMU STOUT H

[facility]     

[id number]                           [facility]

 

        2020-06-10 00:00:00 2020-06-10 00:00:00 Outpatient         GAMA, TRAM 

H     [facility]     

[id number]                           [facility]

 

        2020 00:00:00 2020 00:00:00 Outpatient         GAMA, TRAM [facility]     

[id number]                           [facility]

 

        2020 00:00:00 2020 00:00:00 Outpatient         GAMA, TRAM HM

H     [facility]     

[id number]                           [facility]

 

        2020 00:00:00 2020 00:00:00 Outpatient         ROBLES, ELIZA

AN [facility]     

[id number]                           [facility]

 

        2020 00:00:00 2020 00:00:00 Outpatient         GAMA, TRAM [facility]     

[id number]                           [facility]

 

                    2019 00:00:00 2019 00:00:00 Zahria duque MD: [id number], [place] [address], Ph. [phone]                     

            [facility]    [id number]                  [facility]

 

                    2019-10-09 00:00:00 2019-10-09 00:00:00 Zahira duque MD: [address], [place] [address], Ph. [phone]                     

            [facility]    [id number]                  [facility]

 

                    2019 00:00:00 2019 00:00:00 Zahira duque MD: [address], [place] [address], Ph. [phone]                     

            [facility] [id number]                  [facility]

 

                    2019 00:00:00 2019 00:00:00 Zahira duque MD: [address], [place] [address], Ph. [phone]                     

            [facility] [id number]                  [facility]

 

                    2019 00:00:00 2019 00:00:00 Zahira duque MD: [address], [place] [address], Ph. [phone]                     

            [facility] [id number]                  [facility]

 

        2019 21:19:00 2019 19:02:00 Inpatient E               [facility]  

  MED     [id number]    BL

 

                    2019 00:00:00 2019 00:00:00 Pranav Payne Jr, MD: [address], 

[address], [place] [address], Ph. [phone]                             

    [facility]      [id number]                  [facility]

 

        2019 11:13:00 2019 11:13:00 Emergency E               [facility]  

  [facility]    [id number]    Located within [facility]

 

                    2019 00:00:00 2019 00:00:00 Zahira duque MD: [address], [place] [address], Ph. [phone]                     

            [facility] [id number]                  [facility]

 

                    2019 00:00:00 2019 00:00:00 Zahira duque MD: [address], [place] [address], Ph. [phone]                     

            [facility] [id number]                  [facility]

 

                    2019 00:00:00 2019 00:00:00 Zahira duque MD: [address], [place] [address], Ph. [phone]                     

            [facility] [id number]                  [facility]

 

                    2019 00:00:00 2019 00:00:00 Zahira duque MD: [address], [place] [address], Ph. [phone]                     

            VFP             [place] [facility]

Family Practice - [facility] [id number]                  [facility]

 

                    2019 00:00:00 2019 00:00:00 Zahira duque MD: [address], [place] [address], Ph. [phone]                     

            VF             [place] [facility]

Family Practice - [facility] [id number]                  [facility]

 

             2018-06-10 08:09:00 2018-06-10 11:45:00 Departed Emergency Room 1  

          ISIDORO CHARLES

                [facility]          [id number]    [facility]

 

                    2018 00:00:00 2018 00:00:00 Zahira duque MD: [address], [place] [address], Ph. [phone]                      

           [facility]FP             [place] [facility] 

Family Practice - [facility] [id number]                  [facility]

 

                    2018 00:00:00 2018 00:00:00 Zahira duuqe MD: [address], [place] [address], Ph. [phone]                      

           [facility]             [place] [facility] 

[facility] - [facility] [id number]                  [facility]

 

                    2018 00:00:00 2018 00:00:00 Zahira duque MD: [address], [place] [address], Ph. [phone]                      

           [facility]FP             [place] [facility] 

[facility] - [facility] [id number]                  [facility]

 

                    2018 00:00:00 2018 00:00:00 Zahira duque MD: [address], [place] [address], Ph. [phone]                      

           VFP             [place] [facility] 

Family Practice - [facility] 2018                  [facility]

 

                    2017 00:00:00 2017 00:00:00 Pranav Payne Jr, MD: [address], 

[address], [place] [address], Ph. [phone]                              

   [facility]      [id number]                  [facility]

 

                    2017 00:00:00 2017 00:00:00 Pranav Payne Jr, MD: [address], 

[address], [place] [address], Ph. [phone]                              

   [facility]      [id number]                  [facility]

 

                    2017 00:00:00 2017 00:00:00 Pranav Payne Jr, MD: [address], 

[address], [place] [address], Ph. [phone]                              

   [facility]      [id number]                  [facility]







Results





           Test Description Test Time  Test Comments Results    Result Comments 

Source

 

                    Hepatic Function Panel 2020 09:42:49   

 

                                        Test Item

 

             Protein Total (test code = Protein Total) 7.1 g/dL     5.7-8.2     

               

 

             Albumin Level (test code = Albumin Level) 4.1 g/dL     3.2-4.8     

               

 

             Bilirubin Total (test code = Bilirubin Total) 1.3 mg/dL    0.2-1.1 

     H             

 

             Bilirubin Direct (test code = Bilirubin Direct) 0.7 mg/dL    <=0.3 

       H             

 

             Bilirubin Indirect (test code = Bilirubin Indirect) 0.6 mg/dL    0.

1-1.0                    

 

             Alk Phos (test code = Alk Phos) 130 U/L             H        

     

 

             AST (test code = AST) 57 U/L       <=34         H             

 

             ALT (test code = ALT) 20 U/L       10-49                      

 

             A/G Ratio (test code = A/G Ratio) 1.4 g/dL     0.8-2.0             

       

 

             Globulin (test code = Globulin) 3.0 g/dL     2.3-3.5               

     





Lipid [id number] 09:42:49* 



             Test Item    Value        Reference Range Interpretation Comments

 

             Cholesterol Total (test code = Cholesterol Total) 81 mg/dL         

         N            Low-risk level 

(desirable) - <200 mg/dlModerate-risk level (borderline) - 200-239 mg/dlHigh-
risk level - ?240 mg/dl

 

             Triglycerides (test code = Triglycerides) 65 mg/dL                 

 N            Normal - <150 

mg/dlBorderline high - 150-199 mg/dlHigh - 200-499 mg/dlVery high - ?500 mg/dl

 

             HDL (test code = HDL) 31.80 mg/dL               N            Low-ri

sk level (desirable) - ?60 

mg/dlHigh-risk level (undesirable) - <40 mg/dl

 

             LDL (test code = LDL) 36 mg/dL                  N            The eq

uation being used in this calculation 

is  LDL = (Chol - HDL) - (Trig / 5)

 

             VLDL (test code = VLDL) 13 mg/dL     5-40                      The 

equation being used in this 

calculation is  VLDL = Trig / 5

 

             Chol/HDL (test code = Chol/HDL) 2.5 ratio    <=5.0                 

     

 

             LDL/HDL Ratio (test code = LDL/HDL Ratio) 2                        

 N            The equation being used in this 

calculation is  LDL/HDL Ratio=LDL Calc/HDL Chol





Basic Metabolic [id number] 09:42:48* 



             Test Item    Value        Reference Range Interpretation Comments

 

             Sodium Level (test code = Sodium Level) 141.0 mmol/L 136.0-145.0   

             

 

             Potassium Level (test code = Potassium Level) 3.90 mmol/L  3.50-5.1

0                  

 

             Chloride Level (test code = Chloride Level) 95.0 mmol/L  98.0-107.0

   L             

 

             CO2 (test code = CO2) 38 mmol/L    20-31        H             

 

             Anion Gap (test code = Anion Gap) 8.2 mmol/L   5.0-15.0            

       

 

             BUN (test code = BUN) 56 mg/dL     9-23         H             

 

             Creatinine Level (test code = Creatinine Level) 2.22 mg/dL   0.70-1

.30    H             

 

             BUN/Creat Ratio (test code = BUN/Creat Ratio) 25.2 ratio   10.0-20.

0    H             

 

             Glucose Level (test code = Glucose Level) 105 mg/dL          

               

 

             Calcium Level (test code = Calcium Level) 9.4 mg/dL    8.3-10.6    

               

 

             Hemolysis (test code = Hemolysis) 0 mg/dL      8-11                

       

 

             Icterus (test code = Icterus) 0 mg/dL      8-11                    

   

 

             Lipemia (test code = Lipemia) 0 mg/dL      8-11                    

   





Basic Metabolic [id number] 09:42:48* 



             Test Item    Value        Reference Range Interpretation Comments

 

             Sodium Level (test code = Sodium Level) 141.0 mmol/L 136.0-145.0   

             

 

             Potassium Level (test code = Potassium Level) 3.90 mmol/L  3.50-5.1

0                  

 

             Chloride Level (test code = Chloride Level) 95.0 mmol/L  98.0-107.0

   L             

 

             CO2 (test code = CO2) 38 mmol/L    20-31        H             

 

             Anion Gap (test code = Anion Gap) 8.2 mmol/L   5.0-15.0            

       

 

             BUN (test code = BUN) 56 mg/dL     9-23         H             

 

             Creatinine Level (test code = Creatinine Level) 2.22 mg/dL   0.70-1

.30    H             

 

             BUN/Creat Ratio (test code = BUN/Creat Ratio) 25.2 ratio   10.0-20.

0    H             

 

             Glucose Level (test code = Glucose Level) 105 mg/dL          

               

 

             Calcium Level (test code = Calcium Level) 9.4 mg/dL    8.3-10.6    

               

 

             eGFR AA (test code = eGFR AA) 36 mL/min/1.73 m2 >=60         L     

       eGFR (estimated 

Glomerular Filtration Rate) is an estimated value, calculated from the patient's
serum creatinine using the MDRD equation. It is NOT the patient's actual GFR. 
The eGFR provides a more clinically useful measure of kidney disease than serum 
creatinine alone.***This calculation takes sex and race into account, if the 
information is provided. If the race is not provided, and the patient is 
-American, multiply by 1.212. If sex is not provided, and the patient is 
female, multiply by 0.742. Results for patients <18 years of age have not been 
validated by the MDRD study and should be interpreted with caution. eGFR Result 
Interpretation:eGFR > or = 60 is in the Normal RangeeGFR < 60 may mean kidney 
diseaseeGFR < 15 may mean kidney failure*** Ranges recommended by the National 
Kidney Foundation, http://nkdep.nih.gov

 

             Hemolysis (test code = Hemolysis) 0 mg/dL      8-11                

       

 

             Icterus (test code = Icterus) 0 mg/dL      8-11                    

   

 

             Lipemia (test code = Lipemia) 0 mg/dL      8-11                    

   





Basic Metabolic [id number] 09:42:48* 



             Test Item    Value        Reference Range Interpretation Comments

 

             Sodium Level (test code = Sodium Level) 141.0 mmol/L 136.0-145.0   

             

 

             Potassium Level (test code = Potassium Level) 3.90 mmol/L  3.50-5.1

0                  

 

             Chloride Level (test code = Chloride Level) 95.0 mmol/L  98.0-107.0

   L             

 

             CO2 (test code = CO2) 38 mmol/L    20-31        H             

 

             Anion Gap (test code = Anion Gap) 8.2 mmol/L   5.0-15.0            

       

 

             BUN (test code = BUN) 56 mg/dL     9-23         H             

 

             Creatinine Level (test code = Creatinine Level) 2.22 mg/dL   0.70-1

.30    H             

 

             BUN/Creat Ratio (test code = BUN/Creat Ratio) 25.2 ratio   10.0-20.

0    H             

 

             Glucose Level (test code = Glucose Level) 105 mg/dL          

               

 

             Calcium Level (test code = Calcium Level) 9.4 mg/dL    8.3-10.6    

               

 

             eGFR AA (test code = eGFR AA) 36 mL/min/1.73 m2 >=60         L     

       eGFR (estimated 

Glomerular Filtration Rate) is an estimated value, calculated from the patient's
serum creatinine using the MDRD equation. It is NOT the patient's actual GFR. 
The eGFR provides a more clinically useful measure of kidney disease than serum 
creatinine alone.***This calculation takes sex and race into account, if the 
information is provided. If the race is not provided, and the patient is 
-American, multiply by 1.212. If sex is not provided, and the patient is 
female, multiply by 0.742. Results for patients <18 years of age have not been 
validated by the MDRD study and should be interpreted with caution. eGFR Result 
Interpretation:eGFR > or = 60 is in the Normal RangeeGFR < 60 may mean kidney 
diseaseeGFR < 15 may mean kidney failure*** Ranges recommended by the National 
Kidney Foundation, http://nkdep.nih.gov

 

             eGFR Non-AA (test code = eGFR Non-AA) 29.35 mL/min/1.73 m2 >=60.00 

     L            eGFR 

(estimated Glomerular Filtration Rate) is an estimated value, calculated from 
the patient's serum creatinine using the MDRD equation. It is NOT the patient's 
actual GFR. The eGFR provides a more clinically useful measure of kidney disease
than serum creatinine alone.***This calculation takes sex and race into account,
if the information is provided. If the race is not provided, and the patient is 
-American, multiply by 1.212. If sex is not provided, and the patient is 
female, multiply by 0.742. Results for patients <18 years of age have not been 
validated by the MDRD study and should be interpreted with caution. eGFR Result 
Interpretation:eGFR > or = 60 is in the Normal RangeeGFR < 60 may mean kidney 
diseaseeGFR < 15 may mean kidney failure*** Ranges recommended by the National 
Kidney Foundation, http://nkdep.nih.gov

 

             Hemolysis (test code = Hemolysis) 0 mg/dL      8-11                

       

 

             Icterus (test code = Icterus) 0 mg/dL      8-11                    

   

 

             Lipemia (test code = Lipemia) 0 mg/dL      8-11                    

   





Prothrombin Time and [id number] 09:22:11* 



             Test Item    Value        Reference Range Interpretation Comments

 

             Prothrombin Time (test code = Prothrombin Time) 14.8 seconds 9.8-13

.4     H             

 

             INR (test code = INR) 1.3 ratio    0.6-1.2      H             





Complete Blood Count with Aisamuimkdkc7020-26-32 09:05:32* 



             Test Item    Value        Reference Range Interpretation Comments

 

             WBC (test code = WBC) 8.3 x10      4.4-10.5                   

 

             RBC (test code = RBC) 3.00 x10     4.10-5.70    L             

 

             Hgb (test code = Hgb) 8.1 g/dL     13.4-17.4    L             

 

             Hct (test code = Hct) 26.8 %       38.7-52.0    L             

 

             MCV (test code = MCV) 89.30 fL     80..00               

 

             MCHC (test code = MCHC) 30.20 g/dL   32.00-37.50  L             

 

             RDW CV (test code = RDW CV) 17.3 %       11.5-14.5    H            

 

 

             MCH (test code = MCH) 27.0 pg      27.0-32.5                  

 

             Platelets (test code = Platelets) 302.0 x10    140.0-440.0         

       

 

             MPV (test code = MPV) 11.4 fL                   N             

 

             Slide Review (test code = Slide Review) Auto         Auto          

            Result created by 

MARY_CRISTIAN_SLIDE_REV_AUTO MARY_LASHAM_XN_RFLX [email or web address]

 

             nRBC (test code = nRBC) 0                         N             

 

             NRBC Abs (test code = NRBC Abs) 0.00 x10                  N        

     

 

             Pos Morph XN (test code = Pos Morph XN) A                         N

             

 

             IPF (test code = IPF) 0 %                       N             





Automated Jvnqjicgugfs9090-44-33 09:05:32* 



             Test Item    Value        Reference Range Interpretation Comments

 

             Neutro Auto (test code = Neutro Auto) 70.2 %       36.0-70.0    H  

           

 

             Lymph Auto (test code = Lymph Auto) 12.0 %       12.0-44.0         

         

 

             Mono Auto (test code = Mono Auto) 12.3 %       0.0-11.0     H      

       

 

             Eos, Auto (test code = Eos, Auto) 4.8 %        0.0-7.0             

       

 

             Basophil Auto (test code = Basophil Auto) 0.5 %        0.0-2.0     

               

 

             Neutro Absolute (test code = Neutro Absolute) 5.8 x10      1.6-7.4 

                   

 

             Lymph Absolute (test code = Lymph Absolute) .99 x10      .50-4.60  

                 

 

             Mono Absolute (test code = Mono Absolute) 1.02 x10     .00-1.20    

               

 

             Eos Absolute (test code = Eos Absolute) 0.40 x10     0.00-0.74     

             

 

             Baso Absolute (test code = Baso Absolute) 0.04 x10     0.00-0.21   

               





IG [id number] 09:05:32* 



             Test Item    Value        Reference Range Interpretation Comments

 

             IG (test code = IG) 0.2 %        0.0-5.0                    

 

             IG Abs (test code = IG Abs) 0 x10                     N            

 





XR Chest 1 View [id number] 08:51:06Patient:   CARMEN ROA         
                     MRN:    [id number] Date/Time2020 08:45 CDTReason 
for ExamPRE PROCEDURE;Other (please specify)ReportChest, one viewLOCATION CODE: 
R 16HISTORY: DyspneaCOMPARISON: 3/19/2010FINDINGS:The cardiac silhouette is 
prominent. Mild bilateral hazy lung opacities and prominent interstitial lung 
markings. Mild right pleural effusion. No pneumothorax. The bones are 
intact.IMPRESSION:Findings suggestive of CHF***** Final *****Dictated by:    
MD Dunlap Roman PDictated DT/TM: 2020 8:49 amSigned by:  MD Dunlap Roman 
PSigned (Electronic Signature):  2020 8:51 amGlucose [Mass/volume] in 
Capillary blood2019-10-09 14:25:00* 



             Test Item    Value        Reference Range Interpretation Comments

 

             Blood Glucose: mg/dl (test code = Blood Glucose: mg/dl) 129        

                             





[facility] PracticeGl[facility] [Mass/volume] in Capillary [id number] 
12:59:00* 



             Test Item    Value        Reference Range Interpretation Comments

 

             Blood Glucose: mg/dl (test code = Blood Glucose: mg/dl) 120        

                             





[facility] Practicefecal occult [facility], qyrhj0267-91-92 00:00:00* 



             Test Item    Value        Reference Range Interpretation Comments

 

                                        fecal globin (medicare) by 

ry (test code = fecal globin (medicare) 

by immunochemistry) not detected                                     





[facility] PracticeGlucose [Mass/volume] in Capillary [id number] 
16:58:00* 



             Test Item    Value        Reference Range Interpretation Comments

 

             Blood Glucose: mg/dl (test code = Blood Glucose: mg/dl) 163        

                             





[facility] PracticeGlucose [Mass/volume] in Capillary [id number] 
16:58:00* 



             Test Item    Value        Reference Range Interpretation Comments

 

             Blood Glucose: mg/dl (test code = Blood Glucose: mg/dl) 163        

                             





[facility] PracticeGlucose [Mass/volume] in Capillary [id number] 
16:58:00* 



             Test Item    Value        Reference Range Interpretation Comments

 

             Blood Glucose: mg/dl (test code = Blood Glucose: mg/dl) 163        

                             





[facility] PracticeGlucose [Mass/volume] in Capillary [id number] 
16:59:00* 



             Test Item    Value        Reference Range Interpretation Comments

 

             Blood Glucose: mg/dl (test code = Blood Glucose: mg/dl) 168        

                             





[facility] PracticeGlucose [Mass/volume] in Capillary [id number] 
16:59:00* 



             Test Item    Value        Reference Range Interpretation Comments

 

             Blood Glucose: mg/dl (test code = Blood Glucose: mg/dl) 168        

                             





[facility] PracticeGlucose [Mass/volume] in Capillary [id number] 
16:54:00* 



             Test Item    Value        Reference Range Interpretation Comments

 

             Blood Glucose: mg/dl (test code = Blood Glucose: mg/dl) 107        

                             





[facility] PracticeAbnormal hemoglobin gene panel - Blood by Molecular 
genetics pgzsfm7615-29-10 00:00:00* 



             Test Item    Value        Reference Range Interpretation Comments

 

                    red blood cell count (test code = red blood cell count) 3.65

 million/uL     

4.20-5.80                 L                          

 

             hemoglobin (test code = hemoglobin) 10.8 g/dL    13.2-17.1    L    

         

 

             hematocrit (test code = hematocrit) 34.7 %       38.5-50.0    L    

         

 

             MCV (test code = MCV) 95.1 fL      80.0-100.0                 

 

             MCH (test code = MCH) 29.6 pg      27.0-33.0                  

 

             RDW (test code = RDW) 14.2 %       11.0-15.0                  

 

             hemoglobin A (test code = hemoglobin A) 98.1 %       >96.0         

             

 

             hemoglobin F (test code = hemoglobin F) <1.0         <2.0          

             

 

             hemoglobin A2 (quant) (test code = hemoglobin A2 (quant)) 1.9 %    

    1.8-3.5                    

 

             hemoglobin S (test code = hemoglobin S)                            

             

 

             hemoglobin C (test code = hemoglobin C)                            

             

 

             hemoglobin E (test code = hemoglobin E)                            

             

 

             other hemoglobin 1 (test code = other hemoglobin 1)                

                         

 

             other hemoglobin 2 (test code = other hemoglobin 2)                

                         

 

             interpretation (test code = interpretation)                        

                 





[facility] erythrocytes in [id number] 
00:00:00* 



             Test Item    Value        Reference Range Interpretation Comments

 

                    reticulocyte count, automated (test code = reticulocyte coun

t, automated) 2.6 %               

                                                     

 

                    reticulocyte, absolute (test code = reticulocyte, absolute) 

81987 cells/uL      

69136-51768               H                          





[facility]CB [facility] Auto Differential panel - [id number] 00:00:00
  * 



             Test Item    Value        Reference Range Interpretation Comments

 

             WBC (test code = WBC) 6.26 x10*3/?L 4.23-9.07                  

 

             RBC (test code = RBC) 3.73 10*12/L 4.63-6.08    L             

 

             hemoglobin (test code = hemoglobin) 10.80 g/dL   13.70-17.50  L    

         

 

             hematocrit (test code = hematocrit) 35.8 %       40.1-51.0    L    

         

 

             MCV (test code = MCV) 96.0 fL      80.0-100.0                 

 

             MCH (test code = MCH) 29.0 pg      25.7-32.2                  

 

             MCHC (test code = MCHC) 30.2 g/dL    32.3-36.5    L             

 

             RDW-SD (test code = RDW-SD) 56.2 fL      35.1-43.9    H            

 

 

             platelet count (test code = platelet count) 213.0 k/uL   163.0-337.

0                

 

             MPV (test code = MPV) 12.5 fL      7.5-11.5     H             

 

             neut% (test code = neut%) 69.5 %       34.0-67.9    H             

 

             lymph% (test code = lymph%) 14.5 %       21.8-53.1    L            

 

 

             mon% (test code = mon%) 13.3 %       5.3-12.2     H             

 

             eos% (test code = eos%) 2.4 %        0.8-7.0                    

 

             baso% (test code = baso%) 0.3 %        0.2-1.2                    

 

             neut# (test code = neut#) 4.4 x10*3/?L 1.8-5.4                    

 

             lymph# (test code = lymph#) 0.9 x10*3/?L 1.3-3.6      L            

 

 

             mon# (test code = mon#) 0.8 x10*3/?L 0.3-0.8                    

 

             eos# (test code = eos#) 0.15 x10*3/?L 0.04-0.54                  

 

             baso# (test code = baso#) 0.02 x10*3/?L 0.01-0.08                  





[facility] HospitalFerritin [Mass/volume] in Serum or [id number] 
00:00:00* 



             Test Item    Value        Reference Range Interpretation Comments

 

             ferritin (test code = ferritin) 125.39 NG/mL 21..66          

     





[facility] HospitalThyrotropin [Units/volume] in Serum or [id number] 
00:00:00* 



             Test Item    Value        Reference Range Interpretation Comments

 

             TSH (test code = TSH) 1.460 uIU/mL 0.350-4.940                





[facility] HospitalIron and Iron binding capacity panel - Serum or Plasma
2019 00:00:00* 



             Test Item    Value        Reference Range Interpretation Comments

 

             iron, total (test code = iron, total) 43 mcg/dL           L  

           

 

             transferrin (test code = transferrin) 228 mg/dL    163-344         

           

 

                                        total iron binding capacity (calculated)

 (test code = total iron binding 

capacity (calculated)) 326 mcg/dL      250-425                          

 

             % saturation (test code = % saturation) 13.2 %       15.0-60.0    L

             





[facility] PracticeFolate+Cyanocobalamin [interpretation] in Serum or Blood
2019 00:00:00* 



             Test Item    Value        Reference Range Interpretation Comments

 

             folate (test code = folate) 6.9 NG/mL                              

 

 

             vitamin B12 (test code = vitamin B12) 886 pg/mL    213-816      H  

           





[facility] HospitalBasic metabolic 2000 panel - Serum or [id number] 
00:00:00* 



             Test Item    Value        Reference Range Interpretation Comments

 

             BUN (test code = BUN) 17.6 mg/dL   8.4-25.7                   

 

             glucose (test code = glucose) 147 mg/dL    70-99        H          

   

 

             creatinine (test code = creatinine) 1.04 mg/dL   0.72-1.25         

         

 

             eGFR non- (test code = eGFR non-) >

60                                     

 

             eGFR -  (test code = eGFR - ) >60  

                                   

 

             sodium (test code = sodium) 141 mEq/L    136-145                   

 

 

             potassium (test code = potassium) 4.6 mEq/L    3.5-5.1             

       

 

             chloride (test code = chloride) 102 mmol/L                   

     

 

             calcium (test code = calcium) 9.7 mg/dL    9.0-10.2                

   

 

             CO2 (test code = CO2) 32.3 mmol/L  23.0-31.0    H             

 

             anion gap (test code = anion gap) 7 calc                           

       





[facility] Hospitaltest in question - no test for [id number] 
00:00:00* 



             Test Item    Value        Reference Range Interpretation Comments

 

             question/problem: (test code = question/problem:)                  

                       

 

             specimen(s) received: (test code = specimen(s) received:) sst      

                               

 

             comment (test code = comment)                                      

   





[facility] HospitalGlucose [Mass/volume] in Capillary [id number] 
16:50:00* 



             Test Item    Value        Reference Range Interpretation Comments

 

             Blood Glucose: mg/dl (test code = Blood Glucose: mg/dl) 167        

                             





[facility] HospitalGlucose [Mass/volume] in Capillary [id number] 
16:50:00* 



             Test Item    Value        Reference Range Interpretation Comments

 

             Blood Glucose: mg/dl (test code = Blood Glucose: mg/dl) 167        

                             





[facility] Hospitalankle brachial [id number] 21:03:00* 



             Test Item    Value        Reference Range Interpretation Comments

 

             Result: (test code = Result:) Significant Stenosis                 

           





[facility] Hospitalank brachial [id number] 21:03:00* 



             Test Item    Value        Reference Range Interpretation Comments

 

             Result: (test code = Result:) Significant Stenosis                 

           





[facility] HospitalGlucose [Mass/volume] in Capillary [id number] 
16:34:00* 



             Test Item    Value        Reference Range Interpretation Comments

 

             Blood Glucose: mg/dl (test code = Blood Glucose: mg/dl) 220        

                             





[facility] HospitalGlucose [Mass/volume] in Capillary [id number] 
16:34:00* 



             Test Item    Value        Reference Range Interpretation Comments

 

             Blood Glucose: mg/dl (test code = Blood Glucose: mg/dl) 220        

                             





[facility] HospitalB-Type Natriuretic Peptide2018-06-10 09:21:00* 



             Test Item    Value        Reference Range Interpretation Comments

 

             B-Type Natriuretic Peptide (test code = 27263-4) 842.3        0-100

        H             





[facility] CenterCreatine Kinase [id number] 09:15:00* 



             Test Item    Value        Reference Range Interpretation Comments

 

             Creatine Kinase MB (test code = 37163-1) 3.60         0-5.0        

              





[facility] CenterTroponin -06-10 09:15:00* 



             Test Item    Value        Reference Range Interpretation Comments

 

             Troponin I (test code = JSO6682) 0.017        0-0.300              

      





[facility]:10[facility] 



             Test Item    Value        Reference Range Interpretation Comments

 

             Sodium Level (test code = 2951-2) 143          136-145             

       





[facility] CenterPotassium Level2018-06-10 09:10:00* 



             Test Item    Value        Reference Range Interpretation Comments

 

             Potassium Level (test code = 2823-3) 4.0          3.5-5.1          

          





[facility] CenterChloride Level2018-06-10 09:10:00* 



             Test Item    Value        Reference Range Interpretation Comments

 

             Chloride Level (test code = 2075-0) 100                      

         





[facility] CenterCarbon Dioxide Level2018-06-10 09:10:00* 



             Test Item    Value        Reference Range Interpretation Comments

 

             Carbon Dioxide Level (test code = 2028-9) 34           22-29       

 H             





[facility] CenterAnion Gap2018-06-10 09:10:00* 



             Test Item    Value        Reference Range Interpretation Comments

 

             Anion Gap (test code = 33037-3) 13.0         8-16                  

     





[facility] CenterBlood Urea Nitrogen2018-06-10 09:10:00* 



             Test Item    Value        Reference Range Interpretation Comments

 

             Blood Urea Nitrogen (test code = 3094-0) 18           7-26         

              





[facility] CenterCreatinine2018-06-10 09:10:00* 



             Test Item    Value        Reference Range Interpretation Comments

 

             Creatinine (test code = 2160-0) 1.11         0.72-1.25             

     





[facility] CenterBUN/Creatinine Ratio2018-06-10 09:10:00* 



             Test Item    Value        Reference Range Interpretation Comments

 

             BUN/Creatinine Ratio (test code = 3097-3) 16           6-25        

               





[facility] CenterEstimat Glomerular Filtration Rate
2018-06-10 09:10:00* 



             Test Item    Value        Reference Range Interpretation Comments

 

             Estimat Glomerular Filtration Rate (test code = 42069-6) 60-       

   >60                        





Ranges were taken from the National Kidney Disease Education Program and the [facility] literature.Reference ranges:60 or greater: [place]oxhuu26-87 (
for 3 consecutive months): Chronic kidney disease 15 or less: Kidney failureShannon Medical CenterGlucose Level2018-06-10 09:10:00* 



             Test Item    Value        Reference Range Interpretation Comments

 

             Glucose Level (test code = XZX4363) 140                 H    

         





[facility] CenterCalcium Level2018-06-10 09:10:00* 



             Test Item    Value        Reference Range Interpretation Comments

 

             Calcium Level (test code = 99715-9) 9.8          8.4-10.2          

         





[facility] CenterTotal Bilirubin2018-06-10 09:10:00* 



             Test Item    Value        Reference Range Interpretation Comments

 

             Total Bilirubin (test code = 1975-2) 1.3          0.2-1.2      H   

          





[facility] CenterAspartate Amino Transf (AST/SGOT)
2018-06-10 09:10:00* 



             Test Item    Value        Reference Range Interpretation Comments

 

                                        Aspartate Amino Transf (AST/SGOT) (test 

code = Aspartate Amino Transf 

(AST/SGOT))     29              5-34                             





[facility] CenterAlanine Aminotransferase (ALT/SGPT)
2018-06-10 09:10:00* 



             Test Item    Value        Reference Range Interpretation Comments

 

             Alanine Aminotransferase (ALT/SGPT) (test code = 1742-6) 28        

   0-55                       





[facility] CenterTotal Protein2018-06-10 09:10:00* 



             Test Item    Value        Reference Range Interpretation Comments

 

             Total Protein (test code = 2885-2) 7.3          6.5-8.1            

        





[facility] CenterAlbumin2018-06-10 09:10:00* 



             Test Item    Value        Reference Range Interpretation Comments

 

             Albumin (test code = 1751-7) 3.8          3.5-5.0                  

  





[facility] CenterGlobulin2018-06-10 09:10:00* 



             Test Item    Value        Reference Range Interpretation Comments

 

             Globulin (test code = 73208-0) 3.5          2.3-3.5                

    





[facility] CenterAlbumin/Globulin Ratio2018-06-10 09:10:00
  * 



             Test Item    Value        Reference Range Interpretation Comments

 

             Albumin/Globulin Ratio (test code = 1759-0) 1.1          0.8-2.0   

                 





[facility] CenterAlkaline Phosphatase2018-06-10 09:10:00* 



             Test Item    Value        Reference Range Interpretation Comments

 

             Alkaline Phosphatase (test code = 6768-6) 71                 

               





[facility] CenterCreatine Fonusa5607-45-93 09:10:00* 



             Test Item    Value        Reference Range Interpretation Comments

 

             Creatine Kinase (test code = 2157-6) 331                 H   

          





[facility] CenterCHEST 2 VIEWS2018-06-10 09:09:00    [place] [address]      Patient Name: PAUL ROA   MR #: [id number]    : 
1950 Age/Sex: 67/M  Acct #: [id number] Req #: [id number]  Adm Physician:
     Ordered by: CHARLES CHAUDHRY MD  Report #: [id number]   Location: ER  Room/
Bed:     _______________________________________________________________________
____________________________    Procedure: [id number] DX/CHEST 2 VIEWS  Exam Date
: 06/10/18                            Exam Time: 858       REPORT STATUS: Deepa
d    EXAMINATION:  CHEST 2 VIEWS          INDICATION:                     COMPAR
EMI:  3/6/2017           FINDINGS:  PA and lateral views      TUBES and LINES: 
 Stable single lead left lower lateral chest wall cardiac   device with tip over
lying right atrium.      LUNGS and pleura:  Lungs are well inflated. Central per
ibronchovascular   thickening/cuffing. Left basilar subsegmental atelectasis and
 possible trace   left pleural effusion. No visible pneumothorax.      HEART AND
 MEDIASTINUM:  The cardiomediastinal silhouette is unremarkable.          BONES 
AND SOFT TISSUES:  No acute osseous lesion.  Soft tissues are   unremarkable.   
   UPPER ABDOMEN: No free air under the diaphragm.          IMPRESSION:    Centr
al peribronchovascular thickening/cuffing.    Left basilar subsegmental atelecta
sis and possible trace left pleural effusion.   Underlying/developing infiltrate
 cannot be excluded.         Signed by: Dr. Kevin Peñaloza MD on 6/10/2018 9:12 
AM        Dictated By: KEVIN PEÑALOZA MD  Electronically Signed By: KEVIN PEÑALOZA MD on 06/10/18 0912  Transcribed By: SANDEE on 06/10/18 0912       COPY TO:   
CHARLES CHAUDHRY MD        Prothrombin Time2018-06-10 09:02:00* 



             Test Item    Value        Reference Range Interpretation Comments

 

             Prothrombin Time (test code = 5902-2) 14.3         11.9-14.5       

           





[facility] CenterPro[facility] International Ratio
2018-06-10 09:02:00* 



             Test Item    Value        Reference Range Interpretation Comments

 

             Prothromb Time International Ratio (test code = 6301-6) 1.20       

                             





Oral Anticoagulant Therapy INR Values:1. Low Intensity Therapy        1.5 - 2.02
. Moderate Intensity Therapy   2.0 - 3.03. High Intensity Therapy(1)    2.5 - 3.
54. High Intensity Therapy(2)    3.0 - 4.05. Panic Value INR              > 5.0
[facility] CenterActivated [facility] Thromboplast Time
2018-06-10 09:02:00* 



             Test Item    Value        Reference Range Interpretation Comments

 

             Activated Partial Thromboplast Time (test code = 85072-0) 38.1     

    23.8-35.5    H             





[facility] CenterWhite [facility] Count2018-06-10 08:48:00* 



             Test Item    Value        Reference Range Interpretation Comments

 

             White Blood Count (test code = 6690-2) 7.48         4.8-10.8       

            





[facility] CenterRed [facility]2018-06-10 08:48:00* 



             Test Item    Value        Reference Range Interpretation Comments

 

             Red Blood Count (test code = 789-8) 4.13         4.3-5.7      L    

         





[facility] CenterHemoglobin2018-06-[facility] 08:48:00* 



             Test Item    Value        Reference Range Interpretation Comments

 

             Hemoglobin (test code = 56962-2) 12.3         14.0-18.0    L       

      





[facility] CenterHematocrit2018-06-10 08:48:00* 



             Test Item    Value        Reference Range Interpretation Comments

 

             Hematocrit (test code = 4544-3) 39.3         38.2-49.6             

     





[facility] CenterMean Corpuscular Volume2018-06-10 
08:48:00* 



             Test Item    Value        Reference Range Interpretation Comments

 

             Mean Corpuscular Volume (test code = 787-2) 95.2         81-99     

                 





[facility] CenterMean Corpuscular Hemoglobin2018-06-10 
08:48:00* 



             Test Item    Value        Reference Range Interpretation Comments

 

             Mean Corpuscular Hemoglobin (test code = 785-6) 29.8         28-32 

                     





[facility] CenterMean Corpuscular Hemoglobin Concent
2018-06-10 08:48:00* 



             Test Item    Value        Reference Range Interpretation Comments

 

             Mean Corpuscular Hemoglobin Concent (test code = 786-4) 31.3       

  31-35                      





[facility] CenterRed Cell Distribution Width2018-06-10 
08:48:00* 



             Test Item    Value        Reference Range Interpretation Comments

 

             Red Cell Distribution Width (test code = 75492-8) 14.4         11.7

-14.4                  





[facility] CenterPlatelet Count2018-06-10 08:48:00* 



             Test Item    Value        Reference Range Interpretation Comments

 

             Platelet Count (test code = 777-3) 206          140-360            

        





[facility] CenterNeutrophils (%) (Auto)2018-06-10 08:48:00
  * 



             Test Item    Value        Reference Range Interpretation Comments

 

             Neutrophils (%) (Auto) (test code = 30093-8) 65.7         38.7-80.0

                  





[facility] CenterLymphocytes (%) (Auto)2018-06-10 08:48:00
  * 



             Test Item    Value        Reference Range Interpretation Comments

 

             Lymphocytes (%) (Auto) (test code = 736-9) 16.7         18.0-39.1  

  L             





[facility] CenterMonocytes (%) (Auto)2018-06-10 08:48:00* 



             Test Item    Value        Reference Range Interpretation Comments

 

             Monocytes (%) (Auto) (test code = 5905-5) 14.4         4.4-11.3    

 H             





[facility] CenterEosinophils (%) (Auto)2018-06-10 08:48:00
  * 



             Test Item    Value        Reference Range Interpretation Comments

 

             Eosinophils (%) (Auto) (test code = 713-8) 2.8          0.0-6.0    

                





[facility] CenterBasophils (%) (Auto)2018-06-10 08:48:00* 



             Test Item    Value        Reference Range Interpretation Comments

 

             Basophils (%) (Auto) (test code = 706-2) 0.3          0.0-1.0      

              





[facility] CenterIM GRANULOCYTES %2018-06-10 08:48:00* 



             Test Item    Value        Reference Range Interpretation Comments

 

             IM GRANULOCYTES % (test code = IM GRANULOCYTES %) 0.1          0.0-

1.0                    





[facility] CenterNeutrophils # (Auto)2018-06-10 08:48:00* 



             Test Item    Value        Reference Range Interpretation Comments

 

             Neutrophils # (Auto) (test code = 751-8) 4.9          2.1-6.9      

              





[facility] CenterLymphocytes # (Auto)2018-06-10 08:48:00* 



             Test Item    Value        Reference Range Interpretation Comments

 

             Lymphocytes # (Auto) (test code = 45160-1) 1.3          1.0-3.2    

                





[facility] CenterMonocytes # (Auto)2018-06-10 08:48:00* 



             Test Item    Value        Reference Range Interpretation Comments

 

             Monocytes # (Auto) (test code = 742-7) 1.1          0.2-0.8      H 

            





[facility] CenterEosinophils # (Auto)2018-06-10 08:48:00* 



             Test Item    Value        Reference Range Interpretation Comments

 

             Eosinophils # (Auto) (test code = 711-2) 0.2          0.0-0.4      

              





[facility] CenterBasophils # (Auto)2018-06-10 08:48:00* 



             Test Item    Value        Reference Range Interpretation Comments

 

             Basophils # (Auto) (test code = 704-7) 0.0          0.0-0.1        

            





[facility] CenterAbsolute Immature Granulocyte (auto
2018-06-10 08:48:00* 



             Test Item    Value        Reference Range Interpretation Comments

 

                                        Absolute Immature Granulocyte (auto (francisco j

t code = Absolute Immature Granulocyte 

(auto)          0.01            0-0.1                            





[facility] CenterHepatitis C virus RNA [Units/volume] 
(viral load) in Serum or Plasma by Probe and target amplification method
2017 15:12:00* 



             Test Item    Value        Reference Range Interpretation Comments

 

                    hepatitis C antibody (test code = hepatitis C antibody) non-

reactive        

non-reactive                                         

 

             signal to cut-off (test code = signal to cut-off) 0.05         <1.0

0                      





[facility] [Units/volume] (viral load) in 
Serum or Plasma by Probe and target amplification [id number] 15:12:00* 



             Test Item    Value        Reference Range Interpretation Comments

 

                    hepatitis C antibody (test code = hepatitis C antibody) non-

reactive        

non-reactive                                         

 

             signal to cut-off (test code = signal to cut-off) 0.05         <1.0

0                      





[facility]HIV-[facility]/2 Ag and abs screen, 4TH gen. w/rflx (87646)
2017 10:33:00* 



             Test Item    Value        Reference Range Interpretation Comments

 

             HIV Ag/Ab, 4TH gen (test code = HIV Ag/Ab, 4TH gen) non-reactive no

n-reactive              

 





[facility]HIV[facility] Ag and abs screen, 4TH gen. w/rflx (94967)
2017 10:33:00* 



             Test Item    Value        Reference Range Interpretation Comments

 

             HIV Ag/Ab, 4TH gen (test code = HIV Ag/Ab, 4TH gen) non-reactive no

n-reactive              

 





[facility] HospitalProstate specific Ag [Mass/volume] in Serum or Plasma
2017 08:34:00* 



             Test Item    Value        Reference Range Interpretation Comments

 

             PSA, total (test code = PSA, total) 12.1 NG/mL   < or = 4.0   H    

         





[facility] HospitalProstate specific Ag [Mass/volume] in Serum or Plasma
2017 08:34:00* 



             Test Item    Value        Reference Range Interpretation Comments

 

             PSA, total (test code = PSA, total) 12.1 NG/mL   < or = 4.0   H    

         





[facility] HospitalHemoglobin A1c/Hemoglobin.total in [id number] 
06:15:00* 



             Test Item    Value        Reference Range Interpretation Comments

 

             hemoglobin A1C (test code = hemoglobin A1C) 6.3 % of total HGB <5.7

         H             

 

             EAG (mg/dL) (test code = EAG (mg/dL)) 134 (calc)                   

           

 

             EAG (mmol/L) (test code = EAG (mmol/L)) 7.4 (calc)                 

             





[facility] HospitalHemoglobin A1c/Hemoglobin.total in [id number] 
06:15:00* 



             Test Item    Value        Reference Range Interpretation Comments

 

             hemoglobin A1C (test code = hemoglobin A1C) 6.3 % of total HGB <5.7

         H             

 

             EAG (mg/dL) (test code = EAG (mg/dL)) 134 (calc)                   

           

 

             EAG (mmol/L) (test code = EAG (mmol/L)) 7.4 (calc)                 

             





[facility] W Auto Differential panel - [id number] 05:38:00
  * 



             Test Item    Value        Reference Range Interpretation Comments

 

                    white blood cell count (test code = white blood cell count) 

8.2 thousand/uL     

3.8-10.8                                             

 

                    red blood cell count (test code = red blood cell count) 4.53

 million/uL     

4.20-5.80                                            

 

             hemoglobin (test code = hemoglobin) 13.2 g/dL    13.2-17.1         

         

 

             hematocrit (test code = hematocrit) 40.9 %       38.5-50.0         

         

 

             MCV (test code = MCV) 90.3 fL      80.0-100.0                 

 

             MCH (test code = MCH) 29.1 pg      27.0-33.0                  

 

             MCHC (test code = MCHC) 32.3 g/dL    32.0-36.0                  

 

             RDW (test code = RDW) 12.7 %       11.0-15.0                  

 

             platelet count (test code = platelet count) 313 thousand/uL 140-400

                    

 

             MPV (test code = MPV) 11.7 fL      7.5-12.5                   

 

                absolute neutrophils (test code = absolute neutrophils) [address]

ls/uL   7188-1207       

                                         

 

             absolute band neutrophils (test code = absolute band neutrophils)  

                                       

 

             absolute metamyelocytes (test code = absolute metamyelocytes)      

                                   

 

             absolute myelocytes (test code = absolute myelocytes)              

                           

 

             absolute promyelocytes (test code = absolute promyelocytes)        

                                 

 

                absolute lymphocytes (test code = absolute lymphocytes) [address]

ls/uL   850-3900         

                                         

 

             absolute monocytes (test code = absolute monocytes) 927 cells/uL 20

0-950                    

 

             absolute eosinophils (test code = absolute eosinophils) 492 cells/u

L                      

 

             absolute basophils (test code = absolute basophils) 49 cells/uL  0-

200                      

 

             absolute blasts (test code = absolute blasts)                      

                   

 

             absolute nucleated RBC (test code = absolute nucleated RBC)        

                                 

 

             neutrophils (test code = neutrophils) 56.2 %                       

           

 

             band neutrophils (test code = band neutrophils)                    

                     

 

             metamyelocytes (test code = metamyelocytes)                        

                 

 

             myelocytes (test code = myelocytes)                                

         

 

             promyelocytes (test code = promyelocytes)                          

               

 

             lymphocytes (test code = lymphocytes) 25.9 %                       

           

 

             reactive lymphocytes (test code = reactive lymphocytes)            

                             

 

             monocytes (test code = monocytes) 11.3 %                           

       

 

             eosinophils (test code = eosinophils) 6.0 %                        

           

 

             basophils (test code = basophils) 0.6 %                            

       

 

             blasts (test code = blasts)                                        

 

 

             nucleated RBC (test code = nucleated RBC)                          

               

 

             comment(s) (test code = comment(s))                                

         





[facility] W Auto Differential panel - [id number] 05:38:00
  * 



             Test Item    Value        Reference Range Interpretation Comments

 

                    white blood cell count (test code = white blood cell count) 

8.2 thousand/uL     

3.8-10.8                                             

 

                    red blood cell count (test code = red blood cell count) 4.53

 million/uL     

4.20-5.80                                            

 

             hemoglobin (test code = hemoglobin) 13.2 g/dL    13.2-17.1         

         

 

             hematocrit (test code = hematocrit) 40.9 %       38.5-50.0         

         

 

             MCV (test code = MCV) 90.3 fL      80.0-100.0                 

 

             MCH (test code = MCH) 29.1 pg      27.0-33.0                  

 

             MCHC (test code = MCHC) 32.3 g/dL    32.0-36.0                  

 

             RDW (test code = RDW) 12.7 %       11.0-15.0                  

 

             platelet count (test code = platelet count) 313 thousand/uL 140-400

                    

 

             MPV (test code = MPV) 11.7 fL      7.5-12.5                   

 

                absolute neutrophils (test code = absolute neutrophils) [address]

ls/uL   9226-8089       

                                         

 

             absolute band neutrophils (test code = absolute band neutrophils)  

                                       

 

             absolute metamyelocytes (test code = absolute metamyelocytes)      

                                   

 

             absolute myelocytes (test code = absolute myelocytes)              

                           

 

             absolute promyelocytes (test code = absolute promyelocytes)        

                                 

 

                absolute lymphocytes (test code = absolute lymphocytes) [address]

ls/uL   850-3900         

                                         

 

             absolute monocytes (test code = absolute monocytes) 927 cells/uL 20

0-950                    

 

             absolute eosinophils (test code = absolute eosinophils) 492 cells/u

L                      

 

             absolute basophils (test code = absolute basophils) 49 cells/uL  0-

200                      

 

             absolute blasts (test code = absolute blasts)                      

                   

 

             absolute nucleated RBC (test code = absolute nucleated RBC)        

                                 

 

             neutrophils (test code = neutrophils) 56.2 %                       

           

 

             band neutrophils (test code = band neutrophils)                    

                     

 

             metamyelocytes (test code = metamyelocytes)                        

                 

 

             myelocytes (test code = myelocytes)                                

         

 

             promyelocytes (test code = promyelocytes)                          

               

 

             lymphocytes (test code = lymphocytes) 25.9 %                       

           

 

             reactive lymphocytes (test code = reactive lymphocytes)            

                             

 

             monocytes (test code = monocytes) 11.3 %                           

       

 

             eosinophils (test code = eosinophils) 6.0 %                        

           

 

             basophils (test code = basophils) 0.6 %                            

       

 

             blasts (test code = blasts)                                        

 

 

             nucleated RBC (test code = nucleated RBC)                          

               

 

             comment(s) (test code = comment(s))                                

         





[facility]Com[facility] 2000 panel - Serum or Plasma
2017 05:02:00* 



             Test Item    Value        Reference Range Interpretation Comments

 

             glucose (test code = glucose) 237 mg/dL    65-99        H          

   

 

             urea nitrogen (BUN) (test code = urea nitrogen (BUN)) 32 mg/dL     

7-25         H             

 

             creatinine (test code = creatinine) 1.72 mg/dL   0.70-1.25    H    

         

 

                    eGFR non-afr. american (test code = eGFR non-afr. american) 

40 mL/min/1.73m2    > 

or = 60                   L                          

 

                    eGFR  (test code = eGFR african american) 46

 mL/min/1.73m2    > or 

= 60                      L                          

 

             BUN/creatinine ratio (test code = BUN/creatinine ratio) 19 (calc)  

  6-22                       

 

             sodium (test code = sodium) 141 mmol/L   135-146                   

 

 

             potassium (test code = potassium) 4.3 mmol/L   3.5-5.3             

       

 

             chloride (test code = chloride) 99 mmol/L                    

     

 

             carbon dioxide (test code = carbon dioxide) 26 mmol/L    20-31     

                 

 

             calcium (test code = calcium) 9.4 mg/dL    8.6-10.3                

   

 

             protein, total (test code = protein, total) 7.2 g/dL     6.1-8.1   

                 

 

             albumin (test code = albumin) 3.9 g/dL     3.6-5.1                 

   

 

             globulin (test code = globulin) 3.3 g/dL (calc) 1.9-3.7            

        

 

                albumin/globulin ratio (test code = albumin/globulin ratio) 1.2 

(calc)      1.0-2.5          

                                         

 

             bilirubin, total (test code = bilirubin, total) 0.5 mg/dL    0.2-1.

2                    

 

             alkaline phosphatase (test code = alkaline phosphatase) 74 U/L     

                       

 

             AST (test code = AST) 39 U/L       10-35        H             

 

             ALT (test code = ALT) 35 U/L       9-46                       





[facility]Com[facility] 2000 [facility] - Serum or Plasma
2017 05:02:00* 



             Test Item    Value        Reference Range Interpretation Comments

 

             glucose (test code = glucose) 237 mg/dL    65-99        H          

   

 

             urea nitrogen (BUN) (test code = urea nitrogen (BUN)) 32 mg/dL     

7-25         H             

 

             creatinine (test code = creatinine) 1.72 mg/dL   0.70-1.25    H    

         

 

                    eGFR non-afr. american (test code = eGFR non-afr. american) 

40 mL/min/1.73m2    > 

or = 60                   L                          

 

                    eGFR  (test code = eGFR african american) 46

 mL/min/1.73m2    > or 

= 60                      L                          

 

             BUN/creatinine ratio (test code = BUN/creatinine ratio) 19 (calc)  

  6-22                       

 

             sodium (test code = sodium) 141 mmol/L   135-146                   

 

 

             potassium (test code = potassium) 4.3 mmol/L   3.5-5.3             

       

 

             chloride (test code = chloride) 99 mmol/L                    

     

 

             carbon dioxide (test code = carbon dioxide) 26 mmol/L    20-31     

                 

 

             calcium (test code = calcium) 9.4 mg/dL    8.6-10.3                

   

 

             protein, total (test code = protein, total) 7.2 g/dL     6.1-8.1   

                 

 

             albumin (test code = albumin) 3.9 g/dL     3.6-5.1                 

   

 

             globulin (test code = globulin) 3.3 g/dL (calc) 1.9-3.7            

        

 

                albumin/globulin ratio (test code = albumin/globulin ratio) 1.2 

(calc)      1.0-2.5          

                                         

 

             bilirubin, total (test code = bilirubin, total) 0.5 mg/dL    0.2-1.

2                    

 

             alkaline phosphatase (test code = alkaline phosphatase) 74 U/L     

                       

 

             AST (test code = AST) 39 U/L       10-35        H             

 

             ALT (test code = ALT) 35 U/L       9-46                       





[facility] HospitalCholesterol in LDL [Mass/volume] in Serum or Plasma
2017 00:00:00* 



             Test Item    Value        Reference Range Interpretation Comments

 

             LDL (test code = LDL) 66                                      





[facility] HospitalHemoglobin A1c/Hemoglobin.total in [id number] 
00:00:00* 



             Test Item    Value        Reference Range Interpretation Comments

 

             A1C (test code = A1C) 7.5                                     





[facility] HospitalCholesterol in LDL [Mass/volume] in Serum or Plasma
2017 00:00:00* 



             Test Item    Value        Reference Range Interpretation Comments

 

             LDL (test code = LDL) 66                                      





[facility] HospitalHemoglobin A1c/Hemoglobin.total in [id number] 
00:00:00* 



             Test Item    Value        Reference Range Interpretation Comments

 

             A1C (test code = A1C) 7.5                                     





[facility]

## 2020-09-18 NOTE — XMS REPORT
Clinical Summary

                             Created on: 2020



Ajith Monet

External Reference #: [id number]

: 1950

Sex: Male



Demographics





                          Address                   [facility] [place]  [address]

 

                          Home Phone                [phone]

 

                          Preferred Language        English

 

                          Marital Status            Unknown

 

                          Rastafari Affiliation     None

 

                          Race                      Black or 

 

                          Ethnic Group              Non-





Author





                          Author                    [facility]

 

                          Address                   Unknown

 

                          Phone                     Unavailable







Support





                Name            Relationship    Address         Phone

 

                Ana Monet    ECON            Unknown         [phone]







Care Team Providers





                    Care Team Member Name Role                Phone

 

                    Keshawn Dumont  PCP                 [phone]







Allergies





                                        Comments



                 Active Allergy  Reactions       Severity        Noted Date 

 

                                         



                 Penicillins     Rash            Low             2015 







Medications





                          End Date                  Status



              Medication   Sig          Dispensed    Refills      Start  



                                         Date  

 

                                                    Active



                     metFORMIN (GLUCOPHAGE)  Take 850 mg         0   



                           850 MG tablet             by mouth 3     



                                         (three) times     



                                         daily.     

 

                                                    Active



                     simvastatin (ZOCOR) 40 MG  Take 40 mg by       0   



                           tablet                    mouth     



                                         nightly.     

 

                                                    Active



                     glimepiride (AMARYL) 2 MG  Take 2 mg by        0   



                           tablet                    mouth     



                                         nightly.     

 

                                                    Active



                     levofloxacin (LEVAQUIN)  Take 500 mg         0   



                           500 MG tablet             by mouth     



                                         daily.     

 

                                                    Active



                     acetaminophen-codeine  Take 1 tablet       0   



                           (TYLENOL #4) 300-60 mg    by mouth     



                           per tablet                every 4     



                                         (four) hours     



                                         as needed for     



                                         Pain.     

 

                                                    Active



                     potassium chloride  Take 8 mEq by       0   



                           (KLOR-CON) 8 MEQ CR       mouth daily.     



                                         tablet      

 

                                                    Active



                     lisinopril          Take 20 mg by       0   



                           (PRINIVIL,ZESTRIL) 20 MG  mouth 2 (two)     



                           tablet                    times daily.     

 

                                                    Active



                     hydrochlorothiazide  Take 12.5 mg        0   



                           (MICROZIDE) 12.5 mg       by mouth 2     



                           capsule                   (two) times     



                                         daily .     

 

                                                    Active



                     furosemide (LASIX) 40 MG  Take 80 mg by       0   



                           tablet                    mouth daily.     

 

                                                    Active



                     albuterol (PROVENTIL) 2.5  Take 2.5 mg         0   



                           mg/0.5 mL Nebu nebulizer  by     



                           solution                  nebulization     



                                         2 (two) times     



                                         daily.     

 

                                                    Active



                     aspirin 81 MG chewable  Take 81 mg by       0   



                           tablet                    mouth daily.     

 

                                                    Active



                     sotalol AF (BETAPACE AF)  Take 80 mg by       0   



                           80 MG tablet              mouth 2 (two)     



                                         times daily.     







Active Problems





 



                           Problem                   Noted Date

 

 



                           Chest pain                2015







Social History





                                        Date



                 Tobacco Use     Types           Packs/Day       Years Used 

 

                                         



                                         Former Smoker    







   



                 Alcohol Use     Drinks/Week     oz/Week         Comments

 

   



                                         No   







 



                           Sex Assigned at Birth     Date Recorded

 

 



                                         Not on file 







                                        Industry



                           Job Start Date            Occupation 

 

                                        Not on file



                           Not on file               Not on file 







                                        Travel End



                           Travel History            Travel Start 

 





                                         No recent travel history available.







Last Filed Vital Signs

Not on file



Plan of Treatment





Not on file



Results

Not on fileafter 2019



Insurance





     



            Payer      Benefit    Subscriber ID  Type       Phone      Address



                                         Plan /    



                                         Group    

 

     



                 [employer]       [employer]       xxxxxxxxx       [facility]                   [facility]  







     



            Guarantor Name  Account    Relation to  Date of    Phone      Delio brandon Address



                     Type                Patient             Birth  

 

     



            Ajith Monet  Personal/F  Self       1950  [phone]  [address]

[address]               (Home)              [place] [address]

## 2020-09-18 NOTE — NUR
Patient was seen walking out of room towards the door. Patient was told to wait 
for treatment and he refused and continued walking. The patient stated that he 
was tired of waiting and was not seen by "anyone" and refused to sign any 
paperwork. The patient was hemodynamically stable and did not have intravenous 
access placed.